# Patient Record
Sex: FEMALE | Race: WHITE | NOT HISPANIC OR LATINO | Employment: UNEMPLOYED | URBAN - METROPOLITAN AREA
[De-identification: names, ages, dates, MRNs, and addresses within clinical notes are randomized per-mention and may not be internally consistent; named-entity substitution may affect disease eponyms.]

---

## 2022-01-06 ENCOUNTER — HOSPITAL ENCOUNTER (EMERGENCY)
Facility: HOSPITAL | Age: 42
Discharge: HOME | End: 2022-01-06
Attending: EMERGENCY MEDICINE
Payer: MEDICAID

## 2022-01-06 VITALS
BODY MASS INDEX: 33.75 KG/M2 | HEART RATE: 82 BPM | OXYGEN SATURATION: 96 % | RESPIRATION RATE: 17 BRPM | TEMPERATURE: 98 F | HEIGHT: 66 IN | SYSTOLIC BLOOD PRESSURE: 142 MMHG | WEIGHT: 210 LBS | DIASTOLIC BLOOD PRESSURE: 92 MMHG

## 2022-01-06 DIAGNOSIS — R10.9 RIGHT FLANK PAIN: ICD-10-CM

## 2022-01-06 DIAGNOSIS — N10 ACUTE PYELONEPHRITIS: Primary | ICD-10-CM

## 2022-01-06 DIAGNOSIS — I10 UNCONTROLLED HYPERTENSION: ICD-10-CM

## 2022-01-06 DIAGNOSIS — K80.20 CALCULUS OF GALLBLADDER WITHOUT CHOLECYSTITIS WITHOUT OBSTRUCTION: ICD-10-CM

## 2022-01-06 LAB
ALBUMIN SERPL BCP-MCNC: 4.1 G/DL (ref 3.5–5.2)
ALP SERPL-CCNC: 73 U/L (ref 55–135)
ALT SERPL W/O P-5'-P-CCNC: 49 U/L (ref 10–44)
ANION GAP SERPL CALC-SCNC: 12 MMOL/L (ref 8–16)
AST SERPL-CCNC: 40 U/L (ref 10–40)
B-HCG UR QL: NEGATIVE
BACTERIA #/AREA URNS HPF: NEGATIVE /HPF
BASOPHILS # BLD AUTO: 0.02 K/UL (ref 0–0.2)
BASOPHILS NFR BLD: 0.2 % (ref 0–1.9)
BILIRUB SERPL-MCNC: 0.4 MG/DL (ref 0.1–1)
BILIRUB UR QL STRIP: NEGATIVE
BUN SERPL-MCNC: 12 MG/DL (ref 6–20)
CALCIUM SERPL-MCNC: 9.2 MG/DL (ref 8.7–10.5)
CHLORIDE SERPL-SCNC: 103 MMOL/L (ref 95–110)
CLARITY UR: ABNORMAL
CO2 SERPL-SCNC: 23 MMOL/L (ref 23–29)
COLOR UR: ABNORMAL
CREAT SERPL-MCNC: 0.7 MG/DL (ref 0.5–1.4)
CTP QC/QA: YES
DIFFERENTIAL METHOD: ABNORMAL
EOSINOPHIL # BLD AUTO: 0.1 K/UL (ref 0–0.5)
EOSINOPHIL NFR BLD: 1 % (ref 0–8)
ERYTHROCYTE [DISTWIDTH] IN BLOOD BY AUTOMATED COUNT: 17.4 % (ref 11.5–14.5)
EST. GFR  (AFRICAN AMERICAN): >60 ML/MIN/1.73 M^2
EST. GFR  (NON AFRICAN AMERICAN): >60 ML/MIN/1.73 M^2
GLUCOSE SERPL-MCNC: 100 MG/DL (ref 70–110)
GLUCOSE UR QL STRIP: NEGATIVE
HCT VFR BLD AUTO: 35.5 % (ref 37–48.5)
HGB BLD-MCNC: 10.5 G/DL (ref 12–16)
HGB UR QL STRIP: ABNORMAL
HYALINE CASTS #/AREA URNS LPF: 11 /LPF
IMM GRANULOCYTES # BLD AUTO: 0.02 K/UL (ref 0–0.04)
IMM GRANULOCYTES NFR BLD AUTO: 0.2 % (ref 0–0.5)
INFLUENZA A, MOLECULAR: NEGATIVE
INFLUENZA B, MOLECULAR: NEGATIVE
KETONES UR QL STRIP: NEGATIVE
LEUKOCYTE ESTERASE UR QL STRIP: NEGATIVE
LYMPHOCYTES # BLD AUTO: 1.6 K/UL (ref 1–4.8)
LYMPHOCYTES NFR BLD: 19.2 % (ref 18–48)
MCH RBC QN AUTO: 21.8 PG (ref 27–31)
MCHC RBC AUTO-ENTMCNC: 29.6 G/DL (ref 32–36)
MCV RBC AUTO: 74 FL (ref 82–98)
MICROSCOPIC COMMENT: ABNORMAL
MONOCYTES # BLD AUTO: 0.3 K/UL (ref 0.3–1)
MONOCYTES NFR BLD: 3.8 % (ref 4–15)
NEUTROPHILS # BLD AUTO: 6.1 K/UL (ref 1.8–7.7)
NEUTROPHILS NFR BLD: 75.6 % (ref 38–73)
NITRITE UR QL STRIP: NEGATIVE
NRBC BLD-RTO: 0 /100 WBC
PH UR STRIP: 6 [PH] (ref 5–8)
PLATELET # BLD AUTO: 330 K/UL (ref 150–450)
PMV BLD AUTO: 9.8 FL (ref 9.2–12.9)
POTASSIUM SERPL-SCNC: 4.6 MMOL/L (ref 3.5–5.1)
PROT SERPL-MCNC: 8.4 G/DL (ref 6–8.4)
PROT UR QL STRIP: ABNORMAL
RBC # BLD AUTO: 4.82 M/UL (ref 4–5.4)
RBC #/AREA URNS HPF: 48 /HPF (ref 0–4)
SARS-COV-2 RDRP RESP QL NAA+PROBE: NEGATIVE
SODIUM SERPL-SCNC: 138 MMOL/L (ref 136–145)
SP GR UR STRIP: 1.02 (ref 1–1.03)
SPECIMEN SOURCE: NORMAL
SQUAMOUS #/AREA URNS HPF: 3 /HPF
URN SPEC COLLECT METH UR: ABNORMAL
UROBILINOGEN UR STRIP-ACNC: NEGATIVE EU/DL
WBC # BLD AUTO: 8.11 K/UL (ref 3.9–12.7)
WBC #/AREA URNS HPF: 7 /HPF (ref 0–5)

## 2022-01-06 PROCEDURE — 63600175 PHARM REV CODE 636 W HCPCS: Performed by: EMERGENCY MEDICINE

## 2022-01-06 PROCEDURE — 99284 EMERGENCY DEPT VISIT MOD MDM: CPT | Mod: 25

## 2022-01-06 PROCEDURE — 96375 TX/PRO/DX INJ NEW DRUG ADDON: CPT

## 2022-01-06 PROCEDURE — 96374 THER/PROPH/DIAG INJ IV PUSH: CPT

## 2022-01-06 PROCEDURE — 81001 URINALYSIS AUTO W/SCOPE: CPT | Performed by: NURSE PRACTITIONER

## 2022-01-06 PROCEDURE — 96361 HYDRATE IV INFUSION ADD-ON: CPT

## 2022-01-06 PROCEDURE — 80053 COMPREHEN METABOLIC PANEL: CPT | Performed by: NURSE PRACTITIONER

## 2022-01-06 PROCEDURE — 25000003 PHARM REV CODE 250: Performed by: EMERGENCY MEDICINE

## 2022-01-06 PROCEDURE — 87502 INFLUENZA DNA AMP PROBE: CPT | Performed by: NURSE PRACTITIONER

## 2022-01-06 PROCEDURE — U0002 COVID-19 LAB TEST NON-CDC: HCPCS | Performed by: NURSE PRACTITIONER

## 2022-01-06 PROCEDURE — 85025 COMPLETE CBC W/AUTO DIFF WBC: CPT | Performed by: NURSE PRACTITIONER

## 2022-01-06 PROCEDURE — 81025 URINE PREGNANCY TEST: CPT | Performed by: NURSE PRACTITIONER

## 2022-01-06 RX ORDER — DEXTROAMPHETAMINE SACCHARATE, AMPHETAMINE ASPARTATE, DEXTROAMPHETAMINE SULFATE AND AMPHETAMINE SULFATE 7.5; 7.5; 7.5; 7.5 MG/1; MG/1; MG/1; MG/1
TABLET ORAL
COMMUNITY
End: 2022-10-28 | Stop reason: ALTCHOICE

## 2022-01-06 RX ORDER — ESCITALOPRAM OXALATE 20 MG/1
TABLET ORAL
COMMUNITY
End: 2022-10-28 | Stop reason: ALTCHOICE

## 2022-01-06 RX ORDER — MORPHINE SULFATE 4 MG/ML
4 INJECTION, SOLUTION INTRAMUSCULAR; INTRAVENOUS
Status: COMPLETED | OUTPATIENT
Start: 2022-01-06 | End: 2022-01-06

## 2022-01-06 RX ORDER — ONDANSETRON 2 MG/ML
4 INJECTION INTRAMUSCULAR; INTRAVENOUS
Status: COMPLETED | OUTPATIENT
Start: 2022-01-06 | End: 2022-01-06

## 2022-01-06 RX ORDER — CEFDINIR 300 MG/1
300 CAPSULE ORAL 2 TIMES DAILY
Qty: 20 CAPSULE | Refills: 0 | Status: SHIPPED | OUTPATIENT
Start: 2022-01-06 | End: 2022-01-16

## 2022-01-06 RX ORDER — DICYCLOMINE HYDROCHLORIDE 20 MG/1
20 TABLET ORAL 2 TIMES DAILY PRN
Qty: 20 TABLET | Refills: 0 | Status: SHIPPED | OUTPATIENT
Start: 2022-01-06 | End: 2022-01-16

## 2022-01-06 RX ADMIN — SODIUM CHLORIDE 1000 ML: 0.9 INJECTION, SOLUTION INTRAVENOUS at 01:01

## 2022-01-06 RX ADMIN — MORPHINE SULFATE 4 MG: 4 INJECTION, SOLUTION INTRAMUSCULAR; INTRAVENOUS at 01:01

## 2022-01-06 RX ADMIN — ONDANSETRON 4 MG: 2 INJECTION INTRAMUSCULAR; INTRAVENOUS at 01:01

## 2022-01-06 NOTE — FIRST PROVIDER EVALUATION
Medical screening exam completed.  I have conducted a focused provider triage encounter, findings are as follows:    Brief history of present illness:  41 year old female presents to the ER with cough and recent exposure to influenza, also new onset right flank pain that began today after standing up from seated positon. No dysuria or other urinary complaints. No hx of renal stones. No SOB or CP    There were no vitals filed for this visit.    Pertinent physical exam:  AAOx3, stable vitals, appears in no acute distress.       Brief workup plan:  See orders placed.     Preliminary workup initiated; this workup will be continued and followed by the physician or advanced practice provider that is assigned to the patient when roomed.

## 2022-01-06 NOTE — ED NOTES
To er with c/o rt flank pain. Onset this am. Maew. Speech clear. Skin w/dr/pk. rr even/unlab. Aaox4. She denies fever, vomiting, constipation, diarrhea.

## 2022-01-06 NOTE — DISCHARGE INSTRUCTIONS
RETURN TO EMERGENCY DEPARTMENT WITHOUT FAIL, IF YOUR SYMPTOMS WORSEN, IF YOU GET NEW OR DIFFERENT SYMPTOMS, IF YOU ARE UNABLE TO FOLLOW UP AS DIRECTED, OR IF YOU HAVE ANY CONCERNS OR WORRIES.    Follow-up with general surgery in your primary care provider on an outpatient basis.  Follow a gallbladder diet.  Take antibiotics as directed.

## 2022-01-06 NOTE — ED PROVIDER NOTES
Encounter Date: 1/6/2022       History     Chief Complaint   Patient presents with    Flank Pain     R flank pain since waking up this AM after urinating      This is a 41-year-old female who presents emergency department with right flank pain she states since this morning and nausea.  She has had pain she actually points to her right lower back.  She says that it does seem to be worse with movement and with palpation.  She says it is associated with nausea but no vomiting.  Has been since this morning.  She does not have any urinary symptoms such as frequency urgency burning or any hematuria however she is on her menstrual cycle.  She does not have any fever or chills reported.  No headache or any muscle aches and body aches.  She did have an exposure to someone with influenza.  She denies any chest pain or any shortness of breath.  She has had a prior appendectomy as far surgical history in the past.  She states she used to have a history of high blood pressure but she has never been prescribed medication.        Review of patient's allergies indicates:  No Known Allergies  No past medical history on file.  No past surgical history on file.  No family history on file.     Review of Systems   Constitutional: Negative for chills and fever.   HENT: Negative for sore throat.    Respiratory: Negative for shortness of breath.    Cardiovascular: Negative for chest pain and palpitations.   Gastrointestinal: Positive for abdominal pain and nausea. Negative for vomiting.   Genitourinary: Negative for dysuria.   Musculoskeletal: Positive for back pain.   Skin: Negative for rash.   Neurological: Negative for seizures, weakness and headaches.   Hematological: Does not bruise/bleed easily.   All other systems reviewed and are negative.      Physical Exam     Initial Vitals [01/06/22 1052]   BP Pulse Resp Temp SpO2   (!) 179/117 88 18 97.8 °F (36.6 °C) 95 %      MAP       --         Physical Exam    Nursing note and vitals  reviewed.  Constitutional: She appears well-developed and well-nourished. She is Obese . No distress.   HENT:   Head: Normocephalic and atraumatic.   Mouth/Throat: No oropharyngeal exudate.   Eyes: Conjunctivae and EOM are normal. Pupils are equal, round, and reactive to light.   Neck: Neck supple. No tracheal deviation present.   Normal range of motion.  Cardiovascular: Normal rate, regular rhythm, normal heart sounds and intact distal pulses.   No murmur heard.  Pulmonary/Chest: Breath sounds normal. No stridor. No respiratory distress. She has no wheezes. She has no rhonchi. She has no rales.   Abdominal: Abdomen is soft. She exhibits no distension. There is abdominal tenderness (Right upper quadrant tenderness to palpation). There is no rebound and no guarding.   Musculoskeletal:         General: No tenderness or edema. Normal range of motion.      Cervical back: Normal range of motion and neck supple.      Comments: Low back tenderness to palpation, no right-sided CVA tenderness to palpation.     Lymphadenopathy:     She has no cervical adenopathy.   Neurological: She is alert and oriented to person, place, and time. She has normal strength. No cranial nerve deficit or sensory deficit. GCS score is 15. GCS eye subscore is 4. GCS verbal subscore is 5. GCS motor subscore is 6.   Skin: Skin is warm and dry. Capillary refill takes less than 2 seconds. No rash noted. No erythema. No pallor.   Psychiatric: She has a normal mood and affect. Her behavior is normal. Thought content normal.         ED Course   Procedures  Labs Reviewed   CBC W/ AUTO DIFFERENTIAL - Abnormal; Notable for the following components:       Result Value    Hemoglobin 10.5 (*)     Hematocrit 35.5 (*)     MCV 74 (*)     MCH 21.8 (*)     MCHC 29.6 (*)     RDW 17.4 (*)     Gran % 75.6 (*)     Mono % 3.8 (*)     All other components within normal limits   COMPREHENSIVE METABOLIC PANEL - Abnormal; Notable for the following components:    ALT 49 (*)      All other components within normal limits   URINALYSIS, REFLEX TO URINE CULTURE - Abnormal; Notable for the following components:    Color, UA Orange (*)     Appearance, UA Hazy (*)     Protein, UA 1+ (*)     Occult Blood UA 3+ (*)     All other components within normal limits    Narrative:     Specimen Source->Urine   URINALYSIS MICROSCOPIC - Abnormal; Notable for the following components:    RBC, UA 48 (*)     WBC, UA 7 (*)     Hyaline Casts, UA 11 (*)     All other components within normal limits    Narrative:     Specimen Source->Urine   INFLUENZA A AND B ANTIGEN    Narrative:     Specimen Source->Nasopharyngeal Swab   SARS-COV-2 RNA AMPLIFICATION, QUAL   LIPASE   URINALYSIS   POCT URINE PREGNANCY           Results for orders placed or performed during the hospital encounter of 01/06/22   CBC auto differential   Result Value Ref Range    WBC 8.11 3.90 - 12.70 K/uL    RBC 4.82 4.00 - 5.40 M/uL    Hemoglobin 10.5 (L) 12.0 - 16.0 g/dL    Hematocrit 35.5 (L) 37.0 - 48.5 %    MCV 74 (L) 82 - 98 fL    MCH 21.8 (L) 27.0 - 31.0 pg    MCHC 29.6 (L) 32.0 - 36.0 g/dL    RDW 17.4 (H) 11.5 - 14.5 %    Platelets 330 150 - 450 K/uL    MPV 9.8 9.2 - 12.9 fL    Immature Granulocytes 0.2 0.0 - 0.5 %    Gran # (ANC) 6.1 1.8 - 7.7 K/uL    Immature Grans (Abs) 0.02 0.00 - 0.04 K/uL    Lymph # 1.6 1.0 - 4.8 K/uL    Mono # 0.3 0.3 - 1.0 K/uL    Eos # 0.1 0.0 - 0.5 K/uL    Baso # 0.02 0.00 - 0.20 K/uL    nRBC 0 0 /100 WBC    Gran % 75.6 (H) 38.0 - 73.0 %    Lymph % 19.2 18.0 - 48.0 %    Mono % 3.8 (L) 4.0 - 15.0 %    Eosinophil % 1.0 0.0 - 8.0 %    Basophil % 0.2 0.0 - 1.9 %    Differential Method Automated    Comprehensive metabolic panel   Result Value Ref Range    Sodium 138 136 - 145 mmol/L    Potassium 4.6 3.5 - 5.1 mmol/L    Chloride 103 95 - 110 mmol/L    CO2 23 23 - 29 mmol/L    Glucose 100 70 - 110 mg/dL    BUN 12 6 - 20 mg/dL    Creatinine 0.7 0.5 - 1.4 mg/dL    Calcium 9.2 8.7 - 10.5 mg/dL    Total Protein 8.4 6.0 - 8.4  g/dL    Albumin 4.1 3.5 - 5.2 g/dL    Total Bilirubin 0.4 0.1 - 1.0 mg/dL    Alkaline Phosphatase 73 55 - 135 U/L    AST 40 10 - 40 U/L    ALT 49 (H) 10 - 44 U/L    Anion Gap 12 8 - 16 mmol/L    eGFR if African American >60.0 >60 mL/min/1.73 m^2    eGFR if non African American >60.0 >60 mL/min/1.73 m^2   Urinalysis, Reflex to Urine Culture Urine, Clean Catch    Specimen: Urine   Result Value Ref Range    Specimen UA Urine, Clean Catch     Color, UA Orange (A) Yellow, Straw, [place]    Appearance, UA Hazy (A) Clear    pH, UA 6.0 5.0 - 8.0    Specific Gravity, UA 1.020 1.005 - 1.030    Protein, UA 1+ (A) Negative    Glucose, UA Negative Negative    Ketones, UA Negative Negative    Bilirubin (UA) Negative Negative    Occult Blood UA 3+ (A) Negative    Nitrite, UA Negative Negative    Urobilinogen, UA Negative Negative EU/dL    Leukocytes, UA Negative Negative   Influenza antigen Nasopharyngeal Swab   Result Value Ref Range    Influenza A, Molecular Negative Negative    Influenza B, Molecular Negative Negative    Flu A & B Source Nasal swab    COVID-19 Rapid Screening   Result Value Ref Range    SARS-CoV-2 RNA, Amplification, Qual Negative Negative   Urinalysis Microscopic   Result Value Ref Range    RBC, UA 48 (H) 0 - 4 /hpf    WBC, UA 7 (H) 0 - 5 /hpf    Bacteria Negative None-Occ /hpf    Squam Epithel, UA 3 /hpf    Hyaline Casts, UA 11 (A) 0-1/lpf /lpf    Microscopic Comment SEE COMMENT    POCT urine pregnancy   Result Value Ref Range    POC Preg Test, Ur Negative Negative     Acceptable Yes      CT Abdomen Pelvis  Without Contrast   Final Result      US Abdomen Limited   Final Result          Imaging Results          CT Abdomen Pelvis  Without Contrast (Final result)  Result time 01/06/22 14:36:56    Final result by Elias Vernon MD (01/06/22 14:36:56)                 Narrative:    CMS MANDATED QUALITY DATA-CT RADIATION [id number]    All CT scans at this facility use dose modulation, iterative  reconstruction, and or weight-based dosing when appropriate to reduce radiation dose to as low as reasonably achievable.    HISTORY: Right flank pain, renal calculus suspected.    FINDINGS: Noncontrast axial images were obtained. Nonenhanced study is tailored for the detection of urolithiasis, and is insensitive for abnormalities of the solid organs, vasculature and hollow viscera.  Comparison to ultrasound of the same day.    CT ABDOMEN: The lung bases are clear. A 10 cm hepatic hypodensity adjacent to the fissure for the ligamentum teres is suggestive of a cyst, with the unenhanced liver otherwise unremarkable. No calcified gallstones. The unenhanced spleen is normal in size and unremarkable, with the unenhanced pancreas and adrenal glands unremarkable.    There is mild right-sided hydronephrosis with peripelvic and proximal periureteral fat stranding reflecting edema, with no renal or ureteral calculi identified. The abdominal aorta and iliac arteries are normal in caliber, with no bowel obstruction, ascites, or intraperitoneal free air. There are scattered colon diverticula without evidence of acute diverticulitis. The appendix is surgically absent.    CT PELVIS: The unenhanced rectum, uterus, adnexa and urinary bladder are unremarkable, with a few calcified pelvic phleboliths. There is no pelvic free fluid or mass, with no pelvic or inguinal lymph node enlargement.    The extraperitoneal soft tissues are unremarkable there are no acute fractures or destructive osseous lesions, with intervertebral disc space narrowing and facet arthropathy in the lumbar spine.    IMPRESSION:  1. Mild right-sided hydronephrosis with nonspecific peripelvic and proximal periureteral edema, perhaps reflecting recently passed calculus, and or urinary tract infection.  2. No definite renal or ureteral calculi.    Electronically signed by:  Elias Vernon MD  1/6/2022 2:36 PM [facility] Workstation: [id number]                             US  Abdomen Limited (Final result)  Result time 01/06/22 13:09:45    Final result by Fredy Knight MD (01/06/22 13:09:45)                 Narrative:    Reason: Right upper quadrant pain    COMPARISON: None    FINDINGS:    Liver is mildly enlarged measuring 18.5 cm and demonstrates mild increased echogenicity. An anechoic round cyst is identified in the left hepatic lobe measuring 1.5 cm and right hepatic lobe measuring 2.3 cm in maximal diameter. No other hepatic lesions observed. Hepatopedal flow in main portal vein.    Echogenic gallstones noted within the gallbladder. No gallbladder wall thickening or pericholecystic fluid. Common duct diameter is normal.    Visualized pancreas is unremarkable. Right kidney is free of nephrolithiasis or hydronephrosis. Aorta is nonaneurysmal.    IMPRESSION:    1.  Hepatic steatosis.  2.  Multiple small liver cysts.  3.  Cholelithiasis.    Electronically signed by:  Fredy Knight DO  1/6/2022 1:09 PM CST Workstation: [id number]                            Results for orders placed or performed during the hospital encounter of 01/06/22   CBC auto differential   Result Value Ref Range    WBC 8.11 3.90 - 12.70 K/uL    RBC 4.82 4.00 - 5.40 M/uL    Hemoglobin 10.5 (L) 12.0 - 16.0 g/dL    Hematocrit 35.5 (L) 37.0 - 48.5 %    MCV 74 (L) 82 - 98 fL    MCH 21.8 (L) 27.0 - 31.0 pg    MCHC 29.6 (L) 32.0 - 36.0 g/dL    RDW 17.4 (H) 11.5 - 14.5 %    Platelets 330 150 - 450 K/uL    MPV 9.8 9.2 - 12.9 fL    Immature Granulocytes 0.2 0.0 - 0.5 %    Gran # (ANC) 6.1 1.8 - 7.7 K/uL    Immature Grans (Abs) 0.02 0.00 - 0.04 K/uL    Lymph # 1.6 1.0 - 4.8 K/uL    Mono # 0.3 0.3 - 1.0 K/uL    Eos # 0.1 0.0 - 0.5 K/uL    Baso # 0.02 0.00 - 0.20 K/uL    nRBC 0 0 /100 WBC    Gran % 75.6 (H) 38.0 - 73.0 %    Lymph % 19.2 18.0 - 48.0 %    Mono % 3.8 (L) 4.0 - 15.0 %    Eosinophil % 1.0 0.0 - 8.0 %    Basophil % 0.2 0.0 - 1.9 %    Differential Method Automated    Comprehensive metabolic panel   Result Value  Ref Range    Sodium 138 136 - 145 mmol/L    Potassium 4.6 3.5 - 5.1 mmol/L    Chloride 103 95 - 110 mmol/L    CO2 23 23 - 29 mmol/L    Glucose 100 70 - 110 mg/dL    BUN 12 6 - 20 mg/dL    Creatinine 0.7 0.5 - 1.4 mg/dL    Calcium 9.2 8.7 - 10.5 mg/dL    Total Protein 8.4 6.0 - 8.4 g/dL    Albumin 4.1 3.5 - 5.2 g/dL    Total Bilirubin 0.4 0.1 - 1.0 mg/dL    Alkaline Phosphatase 73 55 - 135 U/L    AST 40 10 - 40 U/L    ALT 49 (H) 10 - 44 U/L    Anion Gap 12 8 - 16 mmol/L    eGFR if African American >60.0 >60 mL/min/1.73 m^2    eGFR if non African American >60.0 >60 mL/min/1.73 m^2   Urinalysis, Reflex to Urine Culture Urine, Clean Catch    Specimen: Urine   Result Value Ref Range    Specimen UA Urine, Clean Catch     Color, UA Orange (A) Yellow, Straw, [place]    Appearance, UA Hazy (A) Clear    pH, UA 6.0 5.0 - 8.0    Specific Gravity, UA 1.020 1.005 - 1.030    Protein, UA 1+ (A) Negative    Glucose, UA Negative Negative    Ketones, UA Negative Negative    Bilirubin (UA) Negative Negative    Occult Blood UA 3+ (A) Negative    Nitrite, UA Negative Negative    Urobilinogen, UA Negative Negative EU/dL    Leukocytes, UA Negative Negative   Influenza antigen Nasopharyngeal Swab   Result Value Ref Range    Influenza A, Molecular Negative Negative    Influenza B, Molecular Negative Negative    Flu A & B Source Nasal swab    COVID-19 Rapid Screening   Result Value Ref Range    SARS-CoV-2 RNA, Amplification, Qual Negative Negative   Urinalysis Microscopic   Result Value Ref Range    RBC, UA 48 (H) 0 - 4 /hpf    WBC, UA 7 (H) 0 - 5 /hpf    Bacteria Negative None-Occ /hpf    Squam Epithel, UA 3 /hpf    Hyaline Casts, UA 11 (A) 0-1/lpf /lpf    Microscopic Comment SEE COMMENT    POCT urine pregnancy   Result Value Ref Range    POC Preg Test, Ur Negative Negative     Acceptable Yes      CT Abdomen Pelvis  Without Contrast   Final Result      US Abdomen Limited   Final Result             Medications   morphine  injection 4 mg (4 mg Intravenous Given 1/6/22 1350)   ondansetron injection 4 mg (4 mg Intravenous Given 1/6/22 1356)   sodium chloride 0.9% bolus 1,000 mL (0 mLs Intravenous Stopped 1/6/22 9765)     Medical Decision Making:   ED Management:  This is a 41-year-old female who presents emergency department right low back right flank pain as described above.  She actually improved after treatment receiving emergency department.  Pain resolved.  Unclear if patient actually passed kidney stone or may have mild urinary tract infection.  Difficult to say given the information on her ureter on CT scan which could be from recently passed stone the or with the white blood cells in the urine could be from early pyelonephritis.  Her pain improved and resolved upon repeat evaluation.  Out of abundance of precaution are place her on antibiotics for the white blood cells in her urine in the findings on CT scan around the ureter.  No obstructing stone currently.  Also considered that this could be her gallbladder as she had right upper quadrant tenderness to palpation she does have gallstones which she does not have cholecystitis on today's emergency department presentation.  I recommend diet for her gallbladder and follow up with General surgery on an outpatient basis.  She and her mother are in agreement with the plan.     I had a detailed discussion with the patient and/or guardian regarding: The historical points, exam findings, and diagnostic results supporting the discharge diagnosis, lab results, pertinent radiology results, and the need for outpatient follow-up, for definitive care with a family practitioner and to return to the emergency department if symptoms worsen or persist or if there are any questions or concerns that arise at home. All questions have been answered in detail. Strict return to Emergency Department precautions have been provided.    A dictation software program was used for this note.  Please expect  some simple typographical  errors in this note.    This patient was seen during the context of the Covid 19 global pandemic where local, state, hospital guidelines, were followed to the best of ability given the circumstances of the pandemic.                        Clinical Impression:   Final diagnoses:  [R10.9] Right flank pain  [N10] Acute pyelonephritis (Primary)  [K80.20] Calculus of gallbladder without cholecystitis without obstruction  [I10] Uncontrolled hypertension          ED Disposition Condition    Discharge Stable        ED Prescriptions     Medication Sig Dispense Start Date End Date Auth. Provider    cefdinir (OMNICEF) 300 MG capsule Take 1 capsule (300 mg total) by mouth 2 (two) times daily. for 10 days 20 capsule 1/6/2022 1/16/2022 Arcadio Mi DO    dicyclomine (BENTYL) 20 mg tablet Take 1 tablet (20 mg total) by mouth 2 (two) times daily as needed. 20 tablet 1/6/2022 1/16/2022 Arcadio Mi DO        Follow-up Information     Follow up With Specialties Details Why Contact Info Additional Information    [facility] - Emergency Dept Emergency Medicine In 3 days  [address] [address]  [phone] 1st floor    Benji Warner MD General Surgery In 3 days  [address]  [address]  [place] [address]  [phone]              Arcadio Mi DO  01/06/22 5474

## 2022-08-19 ENCOUNTER — OFFICE VISIT (OUTPATIENT)
Dept: URGENT CARE | Facility: CLINIC | Age: 42
End: 2022-08-19
Payer: MEDICAID

## 2022-08-19 VITALS
WEIGHT: 231 LBS | OXYGEN SATURATION: 99 % | TEMPERATURE: 98 F | SYSTOLIC BLOOD PRESSURE: 130 MMHG | HEIGHT: 65 IN | BODY MASS INDEX: 38.49 KG/M2 | RESPIRATION RATE: 18 BRPM | HEART RATE: 86 BPM | DIASTOLIC BLOOD PRESSURE: 90 MMHG

## 2022-08-19 DIAGNOSIS — R05.9 COUGH: Primary | ICD-10-CM

## 2022-08-19 DIAGNOSIS — J31.0 RHINITIS, UNSPECIFIED TYPE: ICD-10-CM

## 2022-08-19 DIAGNOSIS — H65.191 ACUTE MIDDLE EAR EFFUSION, RIGHT: ICD-10-CM

## 2022-08-19 DIAGNOSIS — Z20.822 COVID-19 VIRUS NOT DETECTED: ICD-10-CM

## 2022-08-19 LAB
CTP QC/QA: YES
SARS-COV-2 AG RESP QL IA.RAPID: NEGATIVE

## 2022-08-19 PROCEDURE — 3080F PR MOST RECENT DIASTOLIC BLOOD PRESSURE >= 90 MM HG: ICD-10-PCS | Mod: CPTII,S$GLB,, | Performed by: NURSE PRACTITIONER

## 2022-08-19 PROCEDURE — 3075F SYST BP GE 130 - 139MM HG: CPT | Mod: CPTII,S$GLB,, | Performed by: NURSE PRACTITIONER

## 2022-08-19 PROCEDURE — 99204 OFFICE O/P NEW MOD 45 MIN: CPT | Mod: S$GLB,,, | Performed by: NURSE PRACTITIONER

## 2022-08-19 PROCEDURE — 87811 SARS-COV-2 COVID19 W/OPTIC: CPT | Mod: QW,S$GLB,, | Performed by: NURSE PRACTITIONER

## 2022-08-19 PROCEDURE — 3008F BODY MASS INDEX DOCD: CPT | Mod: CPTII,S$GLB,, | Performed by: NURSE PRACTITIONER

## 2022-08-19 PROCEDURE — 99204 PR OFFICE/OUTPT VISIT, NEW, LEVL IV, 45-59 MIN: ICD-10-PCS | Mod: S$GLB,,, | Performed by: NURSE PRACTITIONER

## 2022-08-19 PROCEDURE — 1160F PR REVIEW ALL MEDS BY PRESCRIBER/CLIN PHARMACIST DOCUMENTED: ICD-10-PCS | Mod: CPTII,S$GLB,, | Performed by: NURSE PRACTITIONER

## 2022-08-19 PROCEDURE — 1159F PR MEDICATION LIST DOCUMENTED IN MEDICAL RECORD: ICD-10-PCS | Mod: CPTII,S$GLB,, | Performed by: NURSE PRACTITIONER

## 2022-08-19 PROCEDURE — 1159F MED LIST DOCD IN RCRD: CPT | Mod: CPTII,S$GLB,, | Performed by: NURSE PRACTITIONER

## 2022-08-19 PROCEDURE — 87811 SARS CORONAVIRUS 2 ANTIGEN POCT, MANUAL READ: ICD-10-PCS | Mod: QW,S$GLB,, | Performed by: NURSE PRACTITIONER

## 2022-08-19 PROCEDURE — 3075F PR MOST RECENT SYSTOLIC BLOOD PRESS GE 130-139MM HG: ICD-10-PCS | Mod: CPTII,S$GLB,, | Performed by: NURSE PRACTITIONER

## 2022-08-19 PROCEDURE — 1160F RVW MEDS BY RX/DR IN RCRD: CPT | Mod: CPTII,S$GLB,, | Performed by: NURSE PRACTITIONER

## 2022-08-19 PROCEDURE — 3008F PR BODY MASS INDEX (BMI) DOCUMENTED: ICD-10-PCS | Mod: CPTII,S$GLB,, | Performed by: NURSE PRACTITIONER

## 2022-08-19 PROCEDURE — 3080F DIAST BP >= 90 MM HG: CPT | Mod: CPTII,S$GLB,, | Performed by: NURSE PRACTITIONER

## 2022-08-19 RX ORDER — CETIRIZINE HYDROCHLORIDE 10 MG/1
10 TABLET ORAL DAILY
Qty: 30 TABLET | Refills: 0 | Status: SHIPPED | OUTPATIENT
Start: 2022-08-19 | End: 2022-10-28 | Stop reason: ALTCHOICE

## 2022-08-19 RX ORDER — DOXYCYCLINE 100 MG/1
100 CAPSULE ORAL 2 TIMES DAILY
Qty: 14 CAPSULE | Refills: 0 | Status: SHIPPED | OUTPATIENT
Start: 2022-08-19 | End: 2022-08-26

## 2022-08-19 RX ORDER — FLUTICASONE PROPIONATE 50 MCG
1 SPRAY, SUSPENSION (ML) NASAL DAILY
Qty: 15.8 ML | Refills: 0 | Status: SHIPPED | OUTPATIENT
Start: 2022-08-19 | End: 2022-10-28 | Stop reason: ALTCHOICE

## 2022-08-19 RX ORDER — SERTRALINE HYDROCHLORIDE 25 MG/1
25 TABLET, FILM COATED ORAL DAILY
COMMUNITY
End: 2022-10-28 | Stop reason: DRUGHIGH

## 2022-08-19 RX ORDER — MONTELUKAST SODIUM 10 MG/1
10 TABLET ORAL NIGHTLY
Qty: 30 TABLET | Refills: 0 | Status: SHIPPED | OUTPATIENT
Start: 2022-08-19 | End: 2022-09-18

## 2022-08-19 NOTE — PROGRESS NOTES
"Subjective:       Patient ID: Raina Hoffman is a 41 y.o. female.    Vitals:  height is 5' 5" (1.651 m) and weight is 104.8 kg (231 lb). Her temperature is 98.2 °F (36.8 °C). Her blood pressure is 130/90 (abnormal) and her pulse is 86. Her respiration is 18 and oxygen saturation is 99%.     Chief Complaint: Otalgia    Cough x1 month, not worsening.  Reports yellow sputum production.  Onset of sinus congestion with bilateral ear fullness and intermittent discomfort to right ear x1 week with mild sore throat.    Otalgia   There is pain in both ears. This is a new problem. The current episode started yesterday (x's 2 days ago). The problem occurs constantly. The problem has been unchanged. There has been no fever. The pain is mild. Associated symptoms include coughing (x 1 month) and a sore throat (mild, x 1 week). Pertinent negatives include no diarrhea, ear discharge, rash or vomiting. Treatments tried: dayquil and nyquil.       Constitution: Positive for chills. Negative for fatigue and fever.   HENT: Positive for ear pain (r>L), congestion (x 1 week) and sore throat (mild, x 1 week). Negative for ear discharge, trouble swallowing and voice change.    Respiratory: Positive for cough (x 1 month) and sputum production (yellow). Negative for chest tightness, shortness of breath and wheezing.    Gastrointestinal: Negative for nausea, vomiting and diarrhea.   Musculoskeletal: Negative for muscle ache.   Skin: Negative for rash.       Objective:      Physical Exam   Constitutional: She is oriented to person, place, and time. She appears well-developed.  Non-toxic appearance. She does not appear ill. No distress.   HENT:   Head: Normocephalic and atraumatic.   Ears:   Right Ear: External ear and ear canal normal. Tympanic membrane is bulging. Tympanic membrane is not erythematous. A middle ear effusion is present.   Left Ear: External ear and ear canal normal. Tympanic membrane is bulging. Tympanic membrane is not " erythematous.   Nose: Rhinorrhea and congestion present.   Mouth/Throat: Oropharynx is clear and moist. Mucous membranes are moist. No oropharyngeal exudate or posterior oropharyngeal erythema.   Eyes: Conjunctivae and EOM are normal. Right eye exhibits no discharge. Left eye exhibits no discharge.   Neck: Neck supple. No neck rigidity present.   Cardiovascular: Normal rate, regular rhythm and normal heart sounds.   Pulmonary/Chest: Effort normal and breath sounds normal. No respiratory distress. She has no wheezes. She has no rhonchi. She has no rales.   Abdominal: Normal appearance.   Musculoskeletal: Normal range of motion.         General: Normal range of motion.      Cervical back: She exhibits no tenderness.   Lymphadenopathy:     She has no cervical adenopathy.   Neurological: no focal deficit. She is alert and oriented to person, place, and time.   Skin: Skin is warm, dry and not diaphoretic. Capillary refill takes 2 to 3 seconds.   Psychiatric: Her behavior is normal. Mood normal.   Nursing note and vitals reviewed.        Assessment:       1. Cough    2. COVID-19 virus not detected    3. Acute middle ear effusion, right    4. Rhinitis, unspecified type          Plan:         Cough  -     SARS Coronavirus 2 Antigen, POCT Manual Read    COVID-19 virus not detected    Acute middle ear effusion, right  -     fluticasone propionate (FLONASE) 50 mcg/actuation nasal spray; 1 spray (50 mcg total) by Each Nostril route once daily.  Dispense: 15.8 mL; Refill: 0  -     cetirizine (ZYRTEC) 10 MG tablet; Take 1 tablet (10 mg total) by mouth once daily.  Dispense: 30 tablet; Refill: 0  -     montelukast (SINGULAIR) 10 mg tablet; Take 1 tablet (10 mg total) by mouth every evening.  Dispense: 30 tablet; Refill: 0  -     doxycycline (MONODOX) 100 MG capsule; Take 1 capsule (100 mg total) by mouth 2 (two) times daily. for 7 days  Dispense: 14 capsule; Refill: 0    Rhinitis, unspecified type  -     fluticasone propionate  (FLONASE) 50 mcg/actuation nasal spray; 1 spray (50 mcg total) by Each Nostril route once daily.  Dispense: 15.8 mL; Refill: 0  -     cetirizine (ZYRTEC) 10 MG tablet; Take 1 tablet (10 mg total) by mouth once daily.  Dispense: 30 tablet; Refill: 0    Symptomatic treatment to include:  Singulair daily for 1 month  Doxycycline twice a day for 7 days.  Take with food to help minimize stomach upset  Rest, increase fluid intake to thin mucus, include electrolyte replacement  Ibuprofen/Tylenol as directed for fever, sore throat, body aches  Zrytec daily  Flonase daily until bottle empty  guaifenesin twice daily for 10 days over the counter to thin mucus  Mucinex D from behind pharmacy counter at night for congestion.  Nasal saline spray several times a day  or nasal wash systems  Warm, salt water gargles, over the counter throat lozenges or sprays for sore throat.      Return to clinic or seek medical re-evaluation for worsening of symptoms, onset of fever/chills, production of thick green/cloudy mucus or secretions, or worsening of ear pain.

## 2022-08-19 NOTE — PATIENT INSTRUCTIONS
Symptomatic treatment to include:  Singulair daily for 1 month  Doxycycline twice a day for 7 days.  Take with food to help minimize stomach upset  Rest, increase fluid intake to thin mucus, include electrolyte replacement  Ibuprofen/Tylenol as directed for fever, sore throat, body aches  Zrytec daily  Flonase daily until bottle empty  guaifenesin twice daily for 10 days over the counter to thin mucus  Mucinex D from behind pharmacy counter at night for congestion.  Nasal saline spray several times a day  or nasal wash systems  Warm, salt water gargles, over the counter throat lozenges or sprays for sore throat.      Return to clinic or seek medical re-evaluation for worsening of symptoms, onset of fever/chills, production of thick green/cloudy mucus or secretions, or worsening of ear pain.

## 2022-10-28 ENCOUNTER — OFFICE VISIT (OUTPATIENT)
Dept: FAMILY MEDICINE | Facility: CLINIC | Age: 42
End: 2022-10-28
Payer: MEDICAID

## 2022-10-28 VITALS
BODY MASS INDEX: 40.35 KG/M2 | TEMPERATURE: 98 F | SYSTOLIC BLOOD PRESSURE: 132 MMHG | HEIGHT: 65 IN | HEART RATE: 78 BPM | WEIGHT: 242.19 LBS | OXYGEN SATURATION: 98 % | DIASTOLIC BLOOD PRESSURE: 80 MMHG

## 2022-10-28 DIAGNOSIS — Z11.4 SCREENING FOR HIV WITHOUT PRESENCE OF RISK FACTORS: ICD-10-CM

## 2022-10-28 DIAGNOSIS — F32.A DEPRESSION, UNSPECIFIED DEPRESSION TYPE: Primary | ICD-10-CM

## 2022-10-28 DIAGNOSIS — D64.9 ANEMIA, UNSPECIFIED TYPE: ICD-10-CM

## 2022-10-28 DIAGNOSIS — Z11.59 ENCOUNTER FOR HEPATITIS C SCREENING TEST FOR LOW RISK PATIENT: ICD-10-CM

## 2022-10-28 DIAGNOSIS — R53.83 FATIGUE, UNSPECIFIED TYPE: ICD-10-CM

## 2022-10-28 DIAGNOSIS — Z12.39 ENCOUNTER FOR SCREENING FOR MALIGNANT NEOPLASM OF BREAST, UNSPECIFIED SCREENING MODALITY: ICD-10-CM

## 2022-10-28 DIAGNOSIS — Z00.00 ROUTINE HEALTH MAINTENANCE: ICD-10-CM

## 2022-10-28 DIAGNOSIS — Z01.419 WELL WOMAN EXAM: ICD-10-CM

## 2022-10-28 DIAGNOSIS — G56.02 CARPAL TUNNEL SYNDROME OF LEFT WRIST: ICD-10-CM

## 2022-10-28 DIAGNOSIS — Z13.31 POSITIVE DEPRESSION SCREENING: ICD-10-CM

## 2022-10-28 PROCEDURE — 99215 OFFICE O/P EST HI 40 MIN: CPT | Performed by: NURSE PRACTITIONER

## 2022-10-28 PROCEDURE — 3075F SYST BP GE 130 - 139MM HG: CPT | Mod: CPTII,,, | Performed by: NURSE PRACTITIONER

## 2022-10-28 PROCEDURE — 99204 OFFICE O/P NEW MOD 45 MIN: CPT | Mod: S$PBB,,, | Performed by: NURSE PRACTITIONER

## 2022-10-28 PROCEDURE — 3075F PR MOST RECENT SYSTOLIC BLOOD PRESS GE 130-139MM HG: ICD-10-PCS | Mod: CPTII,,, | Performed by: NURSE PRACTITIONER

## 2022-10-28 PROCEDURE — 99204 PR OFFICE/OUTPT VISIT, NEW, LEVL IV, 45-59 MIN: ICD-10-PCS | Mod: S$PBB,,, | Performed by: NURSE PRACTITIONER

## 2022-10-28 PROCEDURE — 1159F MED LIST DOCD IN RCRD: CPT | Mod: CPTII,,, | Performed by: NURSE PRACTITIONER

## 2022-10-28 PROCEDURE — 1159F PR MEDICATION LIST DOCUMENTED IN MEDICAL RECORD: ICD-10-PCS | Mod: CPTII,,, | Performed by: NURSE PRACTITIONER

## 2022-10-28 PROCEDURE — 3079F DIAST BP 80-89 MM HG: CPT | Mod: CPTII,,, | Performed by: NURSE PRACTITIONER

## 2022-10-28 PROCEDURE — 3079F PR MOST RECENT DIASTOLIC BLOOD PRESSURE 80-89 MM HG: ICD-10-PCS | Mod: CPTII,,, | Performed by: NURSE PRACTITIONER

## 2022-10-28 RX ORDER — SERTRALINE HYDROCHLORIDE 100 MG/1
100 TABLET, FILM COATED ORAL DAILY
COMMUNITY
End: 2022-10-28 | Stop reason: SDUPTHER

## 2022-10-28 RX ORDER — SERTRALINE HYDROCHLORIDE 100 MG/1
100 TABLET, FILM COATED ORAL DAILY
Qty: 30 TABLET | Refills: 2 | Status: SHIPPED | OUTPATIENT
Start: 2022-10-28 | End: 2023-06-14

## 2022-10-28 NOTE — PROGRESS NOTES
Patient ID: Raina Hoffman is a 42 y.o. female.    Chief Complaint: Establish Care    Mr. Hoffman presents today to establish care with me as her PCP. She states she has been well overall but she has been having some tingling in her left hand that is intermittent. She states she does not recall any injury to area and she is right hand dominant. She denies any nerve damage that could possibly be contributing to numbness. She has a PMH of anxiety, depression and anemia. She is currently on Zoloft and is asking for medication to be refilled. She does not currently see a psychiatrist but it is something she would consider if depression is no longer controlled with medication.     She denies any significant medical history. She denies any significant family history.     Depression  Visit Type: initial  Onset of symptoms: more than 5 years ago  Progression since onset: controlled  Patient presents with the following symptoms: excessive worry and nervousness/anxiety.  Frequency of symptoms: most days   Severity: moderate   Aggravated by: family issues  Sleep per night: 6 hours  Sleep quality: fair  Nighttime awakenings: occasional  Risk factors: no known risk factors  Patient has a history of: anxiety/panic attacks  Treatment tried: SSRI  Compliance with treatment: good  Improvement on treatment: moderate    We reviewed overdue health maintenance.       Past Medical History:   Diagnosis Date    Anxiety     Depression      Past Surgical History:   Procedure Laterality Date    APPENDECTOMY       SECTION      EYE SURGERY      TUBAL LIGATION           Tobacco History:  reports that she has never smoked. She has never been exposed to tobacco smoke. She has never used smokeless tobacco.      Review of patient's allergies indicates:  No Known Allergies    Current Outpatient Medications:     sertraline (ZOLOFT) 100 MG tablet, Take 1 tablet (100 mg total) by mouth once daily., Disp: 30 tablet, Rfl: 2    Review of  "Systems   Constitutional:  Positive for unexpected weight change. Negative for activity change, appetite change, fatigue and fever.   HENT:  Positive for trouble swallowing. Negative for congestion, hearing loss, mouth sores, nosebleeds, postnasal drip, rhinorrhea, sinus pressure, sinus pain, sneezing and sore throat.    Eyes:  Negative for pain, discharge, redness, itching and visual disturbance.   Respiratory:  Negative for chest tightness and wheezing.    Gastrointestinal:  Negative for abdominal pain, blood in stool, constipation, diarrhea and vomiting.   Endocrine: Positive for polyuria. Negative for cold intolerance, heat intolerance, polydipsia and polyphagia.   Genitourinary:  Negative for difficulty urinating, dysuria, flank pain, frequency, genital sores, hematuria, menstrual problem, urgency, vaginal bleeding and vaginal discharge.   Musculoskeletal:  Negative for arthralgias, joint swelling, myalgias and neck pain.   Skin:  Negative for rash and wound.   Allergic/Immunologic: Negative for environmental allergies and food allergies.   Neurological:  Negative for weakness, light-headedness and headaches.   Hematological:  Negative for adenopathy. Does not bruise/bleed easily.   Psychiatric/Behavioral:  Positive for depression and dysphoric mood. Negative for agitation, hallucinations, self-injury and sleep disturbance. The patient is nervous/anxious.         Objective:      Vitals:    10/28/22 0957   BP: 132/80   Pulse: 78   Temp: 98.3 °F (36.8 °C)   TempSrc: Oral   SpO2: 98%   Weight: 109.9 kg (242 lb 3.2 oz)   Height: 5' 5" (1.651 m)     Physical Exam  Vitals reviewed.   Constitutional:       General: She is not in acute distress.     Appearance: Normal appearance. She is obese. She is not ill-appearing, toxic-appearing or diaphoretic.      Comments: BMI 40.30 kg   HENT:      Head: Normocephalic and atraumatic.      Right Ear: Tympanic membrane and external ear normal. There is no impacted cerumen.      " Left Ear: Tympanic membrane and external ear normal. There is no impacted cerumen.      Nose: Nose normal. No congestion or rhinorrhea.      Mouth/Throat:      Mouth: Mucous membranes are moist.      Pharynx: Oropharynx is clear. No oropharyngeal exudate or posterior oropharyngeal erythema.   Eyes:      General: No scleral icterus.        Right eye: No discharge.         Left eye: No discharge.      Extraocular Movements: Extraocular movements intact.      Conjunctiva/sclera: Conjunctivae normal.      Pupils: Pupils are equal, round, and reactive to light.   Neck:      Vascular: No carotid bruit.   Cardiovascular:      Rate and Rhythm: Normal rate and regular rhythm.      Pulses: Normal pulses.      Heart sounds: Normal heart sounds. No murmur heard.    No friction rub. No gallop.   Pulmonary:      Effort: Pulmonary effort is normal. No respiratory distress.      Breath sounds: Normal breath sounds. No stridor. No rales.   Chest:      Chest wall: No tenderness.   Abdominal:      General: Abdomen is flat. Bowel sounds are normal.      Palpations: Abdomen is soft. There is no mass.      Tenderness: There is no abdominal tenderness. There is no guarding.   Musculoskeletal:         General: No swelling or signs of injury. Normal range of motion.      Cervical back: Normal range of motion and neck supple. No rigidity or tenderness.      Right lower leg: No edema.      Left lower leg: No edema.   Skin:     General: Skin is warm and dry.      Capillary Refill: Capillary refill takes less than 2 seconds.      Findings: No bruising, lesion or rash.   Neurological:      General: No focal deficit present.      Mental Status: She is alert and oriented to person, place, and time. Mental status is at baseline.      Motor: No weakness.      Coordination: Coordination normal.   Psychiatric:         Mood and Affect: Mood normal.         Behavior: Behavior normal.         Thought Content: Thought content normal.         Judgment:  Judgment normal.         Assessment:       1. Depression, unspecified depression type    2. Routine health maintenance    3. Anemia, unspecified type    4. Fatigue, unspecified type    5. Screening for HIV without presence of risk factors    6. Encounter for hepatitis C screening test for low risk patient    7. Carpal tunnel syndrome of left wrist    8. Well woman exam    9. Encounter for screening for malignant neoplasm of breast, unspecified screening modality    10. Positive depression screening           Plan:       Depression, unspecified depression type  -     sertraline (ZOLOFT) 100 MG tablet; Take 1 tablet (100 mg total) by mouth once daily.  Dispense: 30 tablet; Refill: 2    Routine health maintenance  -     Comprehensive Metabolic Panel; Future; Expected date: 10/28/2022  -     Lipid Panel; Future; Expected date: 10/28/2022    Anemia, unspecified type  -     CBC auto differential; Future; Expected date: 10/28/2022  -     Iron and TIBC; Future; Expected date: 10/28/2022    Fatigue, unspecified type  -     TSH; Future; Expected date: 10/28/2022  -     Vitamin D; Future; Expected date: 10/28/2022    Screening for HIV without presence of risk factors  -     HIV 1/2 Ag/Ab (4th Gen); Future; Expected date: 10/28/2022    Encounter for hepatitis C screening test for low risk patient  -     Hepatitis C antibody; Future; Expected date: 10/28/2022    Carpal tunnel syndrome of left wrist  -     Ambulatory referral/consult to Neurology; Future; Expected date: 11/04/2022    Well woman exam  -     Ambulatory referral/consult to Obstetrics / Gynecology; Future; Expected date: 11/04/2022    Encounter for screening for malignant neoplasm of breast, unspecified screening modality  -     Mammo Digital Screening Bilat; Future; Expected date: 10/28/2022    Positive depression screening  Comments:  I have reviewed the positive depression score which warrants active treatment with psychotherapy and/or medications.    Follow up in  about 1 year (around 10/28/2023), or if symptoms worsen or fail to improve, for Annual.        10/28/2022 Laura Abdalla NP      I have reviewed the positive depression score which warrants active treatment with psychotherapy and/or medications. Medication sent to pharmacy on file.

## 2022-11-09 ENCOUNTER — TELEPHONE (OUTPATIENT)
Dept: FAMILY MEDICINE | Facility: CLINIC | Age: 42
End: 2022-11-09

## 2022-11-09 DIAGNOSIS — Z12.31 BREAST CANCER SCREENING BY MAMMOGRAM: Primary | ICD-10-CM

## 2022-11-09 NOTE — TELEPHONE ENCOUNTER
----- Message from Vera Fox sent at 11/9/2022 11:07 AM CST -----  Regarding: Screening Mammogram  Good morning! The DX code Z12.39 is not a payable DX code on this order. Please correct with DX code Z12.31, so that we can get the patient scheduled as soon as possible please.    Thank you,  Vera

## 2022-11-16 LAB
25(OH)D3 SERPL-MCNC: 20 NG/ML (ref 30–100)
ALBUMIN SERPL-MCNC: 4.3 G/DL (ref 3.6–5.1)
ALBUMIN/GLOB SERPL: 1.3 (CALC) (ref 1–2.5)
ALP SERPL-CCNC: 69 U/L (ref 31–125)
ALT SERPL-CCNC: 37 U/L (ref 6–29)
AST SERPL-CCNC: 25 U/L (ref 10–30)
BASOPHILS # BLD AUTO: 32 CELLS/UL (ref 0–200)
BASOPHILS NFR BLD AUTO: 0.6 %
BILIRUB SERPL-MCNC: 0.4 MG/DL (ref 0.2–1.2)
BUN SERPL-MCNC: 11 MG/DL (ref 7–25)
BUN/CREAT SERPL: ABNORMAL (CALC) (ref 6–22)
CALCIUM SERPL-MCNC: 9.3 MG/DL (ref 8.6–10.2)
CHLORIDE SERPL-SCNC: 109 MMOL/L (ref 98–110)
CHOLEST SERPL-MCNC: 224 MG/DL
CHOLEST/HDLC SERPL: 5.1 (CALC)
CO2 SERPL-SCNC: 25 MMOL/L (ref 20–32)
CREAT SERPL-MCNC: 0.7 MG/DL (ref 0.5–0.99)
EGFR: 111 ML/MIN/1.73M2
EOSINOPHIL # BLD AUTO: 170 CELLS/UL (ref 15–500)
EOSINOPHIL NFR BLD AUTO: 3.2 %
ERYTHROCYTE [DISTWIDTH] IN BLOOD BY AUTOMATED COUNT: 18.4 % (ref 11–15)
GLOBULIN SER CALC-MCNC: 3.3 G/DL (CALC) (ref 1.9–3.7)
GLUCOSE SERPL-MCNC: 94 MG/DL (ref 65–99)
HCT VFR BLD AUTO: 32.7 % (ref 35–45)
HCV AB S/CO SERPL IA: 0.04
HCV AB SERPL QL IA: NORMAL
HDLC SERPL-MCNC: 44 MG/DL
HGB BLD-MCNC: 10.2 G/DL (ref 11.7–15.5)
HIV 1+2 AB+HIV1 P24 AG SERPL QL IA: NORMAL
IRON SATN MFR SERPL: 7 % (CALC) (ref 16–45)
IRON SERPL-MCNC: 31 MCG/DL (ref 40–190)
LDLC SERPL CALC-MCNC: 158 MG/DL (CALC)
LYMPHOCYTES # BLD AUTO: 1590 CELLS/UL (ref 850–3900)
LYMPHOCYTES NFR BLD AUTO: 30 %
MCH RBC QN AUTO: 22.8 PG (ref 27–33)
MCHC RBC AUTO-ENTMCNC: 31.2 G/DL (ref 32–36)
MCV RBC AUTO: 73 FL (ref 80–100)
MONOCYTES # BLD AUTO: 360 CELLS/UL (ref 200–950)
MONOCYTES NFR BLD AUTO: 6.8 %
NEUTROPHILS # BLD AUTO: 3148 CELLS/UL (ref 1500–7800)
NEUTROPHILS NFR BLD AUTO: 59.4 %
NONHDLC SERPL-MCNC: 180 MG/DL (CALC)
PLATELET # BLD AUTO: 332 THOUSAND/UL (ref 140–400)
PMV BLD REES-ECKER: 10.7 FL (ref 7.5–12.5)
POTASSIUM SERPL-SCNC: 4.5 MMOL/L (ref 3.5–5.3)
PROT SERPL-MCNC: 7.6 G/DL (ref 6.1–8.1)
RBC # BLD AUTO: 4.48 MILLION/UL (ref 3.8–5.1)
SODIUM SERPL-SCNC: 141 MMOL/L (ref 135–146)
TIBC SERPL-MCNC: 417 MCG/DL (CALC) (ref 250–450)
TRIGL SERPL-MCNC: 103 MG/DL
TSH SERPL-ACNC: 1.74 MIU/L
WBC # BLD AUTO: 5.3 THOUSAND/UL (ref 3.8–10.8)

## 2022-12-02 ENCOUNTER — HOSPITAL ENCOUNTER (OUTPATIENT)
Dept: RADIOLOGY | Facility: HOSPITAL | Age: 42
Discharge: HOME | End: 2022-12-02
Attending: NURSE PRACTITIONER
Payer: MEDICAID

## 2022-12-02 DIAGNOSIS — Z12.31 BREAST CANCER SCREENING BY MAMMOGRAM: ICD-10-CM

## 2022-12-02 PROCEDURE — 77063 BREAST TOMOSYNTHESIS BI: CPT | Mod: TC,PO

## 2022-12-02 PROCEDURE — 77067 SCR MAMMO BI INCL CAD: CPT | Mod: TC,PO

## 2023-04-04 ENCOUNTER — HOSPITAL ENCOUNTER (EMERGENCY)
Facility: HOSPITAL | Age: 43
Discharge: HOME | End: 2023-04-04
Attending: EMERGENCY MEDICINE
Payer: MEDICAID

## 2023-04-04 VITALS
HEART RATE: 73 BPM | OXYGEN SATURATION: 97 % | DIASTOLIC BLOOD PRESSURE: 94 MMHG | SYSTOLIC BLOOD PRESSURE: 154 MMHG | WEIGHT: 220 LBS | HEIGHT: 65 IN | RESPIRATION RATE: 18 BRPM | TEMPERATURE: 98 F | BODY MASS INDEX: 36.65 KG/M2

## 2023-04-04 DIAGNOSIS — L72.3 INFECTED SEBACEOUS CYST: Primary | ICD-10-CM

## 2023-04-04 DIAGNOSIS — L08.9 INFECTED SEBACEOUS CYST: Primary | ICD-10-CM

## 2023-04-04 LAB — B-HCG UR QL: NEGATIVE

## 2023-04-04 PROCEDURE — 99284 EMERGENCY DEPT VISIT MOD MDM: CPT | Mod: 25

## 2023-04-04 PROCEDURE — 10060 I&D ABSCESS SIMPLE/SINGLE: CPT

## 2023-04-04 PROCEDURE — 81025 URINE PREGNANCY TEST: CPT | Performed by: PHYSICIAN ASSISTANT

## 2023-04-04 PROCEDURE — 25000003 PHARM REV CODE 250: Performed by: PHYSICIAN ASSISTANT

## 2023-04-04 RX ORDER — LIDOCAINE HYDROCHLORIDE 10 MG/ML
10 INJECTION INFILTRATION; PERINEURAL
Status: COMPLETED | OUTPATIENT
Start: 2023-04-04 | End: 2023-04-04

## 2023-04-04 RX ORDER — MUPIROCIN 20 MG/G
OINTMENT TOPICAL 3 TIMES DAILY
Qty: 30 G | Refills: 0 | Status: SHIPPED | OUTPATIENT
Start: 2023-04-04 | End: 2023-04-14

## 2023-04-04 RX ORDER — IBUPROFEN 600 MG/1
600 TABLET ORAL
Status: COMPLETED | OUTPATIENT
Start: 2023-04-04 | End: 2023-04-04

## 2023-04-04 RX ORDER — SULFAMETHOXAZOLE AND TRIMETHOPRIM 800; 160 MG/1; MG/1
1 TABLET ORAL 2 TIMES DAILY
Qty: 14 TABLET | Refills: 0 | Status: SHIPPED | OUTPATIENT
Start: 2023-04-04 | End: 2023-04-11

## 2023-04-04 RX ORDER — SULFAMETHOXAZOLE AND TRIMETHOPRIM 800; 160 MG/1; MG/1
1 TABLET ORAL
Status: COMPLETED | OUTPATIENT
Start: 2023-04-04 | End: 2023-04-04

## 2023-04-04 RX ADMIN — LIDOCAINE HYDROCHLORIDE 10 ML: 10 INJECTION, SOLUTION INFILTRATION; PERINEURAL at 12:04

## 2023-04-04 RX ADMIN — IBUPROFEN 600 MG: 600 TABLET ORAL at 12:04

## 2023-04-04 RX ADMIN — SULFAMETHOXAZOLE AND TRIMETHOPRIM 1 TABLET: 800; 160 TABLET ORAL at 12:04

## 2023-04-04 NOTE — ED PROVIDER NOTES
Encounter Date: 2023    SCRIBE #1 NOTE: I, Cedric Matute, am scribing for, and in the presence of,  Nallely Fatima PA-C.     History     Chief Complaint   Patient presents with    Abscess     Patient believes she has an abscess or cyst on her chest states that the area has been there about a year and in the past 2 weeks it has become red and swollen      Time seen by provider: 11:52 PM on 2023    Raina Hoffman is a 42 y.o. female who presents to the ED with a cyst. The patient reports having a cyst on her chest for the past few weeks that has since grown bigger and become more painful within the past few days. She has not used any medication for her symptom. The patient denies any other symptoms at this time. No pertinent PMHx. No pertinent PSHx.    The history is provided by the patient.   Review of patient's allergies indicates:  No Known Allergies  Past Medical History:   Diagnosis Date    Anxiety     Depression      Past Surgical History:   Procedure Laterality Date    APPENDECTOMY       SECTION      EYE SURGERY      TUBAL LIGATION       Family History   Problem Relation Age of Onset    No Known Problems Mother     Hypertension Father     Thyroid disease Father      Social History     Tobacco Use    Smoking status: Never     Passive exposure: Never    Smokeless tobacco: Never   Substance Use Topics    Alcohol use: Yes     Comment: socially    Drug use: Never     Review of Systems   Constitutional:  Negative for chills and fever.   Musculoskeletal:  Negative for arthralgias, back pain, myalgias, neck pain and neck stiffness.   Skin:  Positive for wound. Negative for color change, pallor and rash.   Neurological:  Negative for weakness and numbness.   Hematological:  Does not bruise/bleed easily.     Physical Exam     Initial Vitals [23 1103]   BP Pulse Resp Temp SpO2   (!) 152/92 76 20 98.1 °F (36.7 °C) 98 %      MAP       --         Physical Exam    Nursing note and vitals  reviewed.  Constitutional: She appears well-developed and well-nourished. She is not diaphoretic. No distress.   HENT:   Head: Normocephalic and atraumatic.   Cardiovascular:  Normal rate, regular rhythm, normal heart sounds and intact distal pulses.           Pulmonary/Chest: Breath sounds normal. No respiratory distress. She has no wheezes.   Musculoskeletal:         General: Tenderness present. No edema. Normal range of motion.     Neurological: She is alert and oriented to person, place, and time. She has normal strength. No sensory deficit.   Skin: Skin is warm and dry. Abscess noted. No rash noted. There is erythema.   Moderately sized area of erythema and induration with associated TTP noted to mid chest wall.  No active bleeding or discharge.     Psychiatric: She has a normal mood and affect.       ED Course   I & D - Incision and Drainage    Date/Time: 4/4/2023 4:27 PM  Location procedure was performed: [facility] EMERGENCY DEPARTMENT  Performed by: Nallely Fatima PA-C  Authorized by: Yaw Luna MD   Type: abscess  Body area: trunk  Location details: chest  Anesthesia: local infiltration    Anesthesia:  Local Anesthetic: lidocaine 1% without epinephrine  Anesthetic total: 3 mL    Patient sedated: no  Scalpel size: 11  Incision type: single straight  Incision depth: dermal  Drainage: serosanguinous and purulent  Drainage amount: moderate  Wound treatment: incision, drainage, expression of material, wound packed and deloculation  Packing material: 1/4 in gauze  Patient tolerance: Patient tolerated the procedure well with no immediate complications    Incision depth: dermal      Labs Reviewed   PREGNANCY TEST, URINE RAPID    Narrative:     Specimen Source->Urine          Imaging Results    None          Medications   LIDOcaine HCL 10 mg/ml (1%) injection 10 mL (10 mLs Infiltration Given 4/4/23 1252)   ibuprofen tablet 600 mg (600 mg Oral Given 4/4/23 1252)   sulfamethoxazole-trimethoprim 800-160mg  per tablet 1 tablet (1 tablet Oral Given 4/4/23 1252)     Medical Decision Making:   History:   Old Medical Records: I decided to obtain old medical records.  Old Records Summarized: records from clinic visits and records from previous admission(s).  Differential Diagnosis:   Abscess  Cellulitis   Infected sebaceous cyst   Folliculitis   Clinical Tests:   Lab Tests: Ordered and Reviewed  ED Management:  Pt emergently evaluated here in the ED.   Symptoms consistent with infected sebaceous cyst and she tolerated the I&D well.  She will be discharged home to follow-up with either general surgery or dermatology for further management if she wishes to have the cyst capsule removed.  She is given a prescription for Bactrim and topical Bactroban.  She voices understanding and is agreeable to the plan.  She is given specific return precautions.           Scribe Attestation:   Scribe #1: I performed the above scribed service and the documentation accurately describes the services I performed. I attest to the accuracy of the note.    Attending Attestation:     Physician Attestation Statement for NP/PA:   I discussed this assessment and plan of this patient with the NP/PA, but I did not personally examine the patient. The face to face encounter was performed by the NP/PA.    Other NP/PA Attestation Additions:      Medical Decision Making: Raina Hoffman is a 42 y.o. female presenting with longstanding lesion to chest wall with recent enlargement and pain marked by purulent drainage on incision and drainage here.  I suspect infected cyst.  Short course of antibiotics prescribed pending outpatient follow-up.  She is advised to follow-up for definitive management that may include wider excision.                      I, Nallely Fatima PA-C, personally performed the services described in this documentation. All medical record entries made by the scribe were at my direction and in my presence.  I have reviewed the chart and  agree that the record reflects my personal performance and is accurate and complete. Nallely Fatima PA-C.  4:30 PM 04/04/2023    Clinical Impression:   Final diagnoses:  [L72.3, L08.9] Infected sebaceous cyst (Primary)        ED Disposition Condition    Discharge Stable          ED Prescriptions       Medication Sig Dispense Start Date End Date Auth. Provider    sulfamethoxazole-trimethoprim 800-160mg (BACTRIM DS) 800-160 mg Tab Take 1 tablet by mouth 2 (two) times daily. for 7 days 14 tablet 4/4/2023 4/11/2023 Nallely Fatima PA-C    mupirocin (BACTROBAN) 2 % ointment Apply topically 3 (three) times daily. for 10 days 30 g 4/4/2023 4/14/2023 Nallely Fatima PA-C          Follow-up Information       Follow up With Specialties Details Why Contact Info    [facility] Emergency Dept Emergency Medicine  As needed, If symptoms worsen [address] [place] [id number] [phone]    Jayden Mccullough III, MD General Surgery, Surgery  for general surgery evaluation [address] [place] [address]  [address]  [facility] [address]  [phone]      Edwina Powell MD Dermatology  for dermatology evaluation [address] [place] [address] [address]  [facility] [address]  [phone]               Nallely Fatima PA-C  04/04/23 2233

## 2023-04-04 NOTE — ED NOTES
[facility]  INTENSIVE CARE UNIT  PROGRESS NOTE    Patient: Namita Melgar Date of Service: 12/4/2022   MRN: [id number] Time: 6:47 AM    YOB: 1951  Length of Stay: 16 days       SUBJECTIVE     Interval History: Namita Melgar is a 71 year old female with a history of CAD s/p CABG, DM, HLD, HTN that presented 11/18 for robotic RLL wedge lobectomy.     Patient has a CT chest uploaded 11/3 showing from my read a R sided cystic lung disease along with a sub1cm spiculated nodule that on 11/16 enlarged to 2.2x1.5cm.      11/18 RLL wedge, RLL lobectomy, and mediastinal LN dissection was competed. Pathology positive for invasive SCC moderately differentiated with mets to LN including station 7. Chest tube placed R sided.      On 11/22 chest tube was removed. Patient had imaging concerning for infiltrates and began to become hypoxic requiring 2L O2. Started on antibiotics for HAP and steriods + bronchodilators for possible exacerbation of emphysema and gentle diuresis for concern of fluid overload. Patient's hypoxia worsened requiring up to 15L O2 and CTA completed 11/28 showed GGO b/l including MARA and RML and R pleural effusion s/p thoracenteiss 11/28 exudative approx 400cc.  11/29  zosyn was switched to meropenem. She is net neg 17.9L.      12/2 Hypoxia worsened and patient was placed on % then transitioned to bipap. Temp up to 103.  Transferred to ICU.    12/3: Patient tolerated Optiflow 50L/50% for several hours. Started on D5W gtt for hypernatremia    24hr Significant Events: Tolerated BiPAP overnight. This am, patient c/o nausea and \"pain all over.\"  Patient tolertated Optiflow 50Lpm 70% for almost an hour before become hypoxic. Stat CXR revealed increased hazy opacities bilaterally. Patient placed back on BiPAP. Lasix 20mg IV x 1 ordered.     OBJECTIVE     Vital signs for last 24 hours:  Temp:  [97.6 °F (36.4 °C)-99.4 °F (37.4 °C)] 99.4 °F (37.4 °C)  Heart Rate:  [] 92  Resp:   Assumed care    Patient presents to ED with complaints of pain to middle of chest with red, hard, cyst-like swelling without drainage. Denies fever at home. Patient without any changes in bowel or bladder. Patient states she has had hard substance to middle of chest for over a year but in the last 2 days she noticed the pain and swelling with redness. No nausea or vomiting at this time.    [17-40] 17  BP: (126-198)/() 165/66  FiO2 (%):  [35 %-50 %] 50 %    Medications:  Current Facility-Administered Medications   Medication Dose Route Frequency Provider Last Rate Last Admin   • dextrose 5 % infusion   Intravenous Continuous Ginger Terrazas CNP 75 mL/hr at 12/04/22 0622 Rate Verify at 12/04/22 0622   • dextrose 5 % infusion   Intravenous Continuous PRN Nitish Castaneda MD         Current Facility-Administered Medications   Medication   • potassium CHLORIDE 20 mEq/100mL IVPB premix    Followed by   • potassium CHLORIDE 20 mEq/100mL IVPB premix   • insulin glargine (LANTUS) injection 15 Units   • lidocaine (LIDOCARE) 4 % patch 1 patch   • dextrose 5 % infusion   • insulin glargine (LANTUS) injection 10 Units   • fentaNYL (SUBLIMAZE) injection 25 mcg   • labetalol (NORMODYNE) injection 20 mg   • [Held by provider] metoPROLOL tartrate (LOPRESSOR) tablet 50 mg   • pantoprazole (PROTONIX INJECT) injection 40 mg   • metoPROLOL (LOPRESSOR) injection 5 mg   • hydrALAZINE (APRESOLINE) injection 10 mg   • dexAMETHasone (DECADRON) injection 6 mg   • remdesivir (VEKLURY) 100 mg in sodium chloride 0.9 % 250 mL total volume infusion   • bacitracin ointment   • oxymetazoline (AFRIN) 0.05 % nasal spray 2 spray   • [Held by provider] clopidogrel (PLAVIX) tablet 75 mg   • [Held by provider] lactobacillus acidophilus (BACID) tablet 1 tablet   • ipratropium-albuterol (DUONEB) 0.5-2.5 (3) MG/3ML nebulizer solution 3 mL   • [Held by provider] furosemide (LASIX) tablet 40 mg   • [Held by provider] polyethylene glycol (MIRALAX) packet 17 g   • [Held by provider] senna (SENOKOT) 8.6 mg   • insulin lispro (ADMELOG,HumaLOG) - Correction Dose   • insulin lispro (ADMELOG,HumaLOG) - Scheduled Mealtime Dose   • ipratropium-albuterol (DUONEB) 0.5-2.5 (3) MG/3ML nebulizer solution 3 mL   • albuterol (ACCUNEB) nebulizer 0.63 mg   • dextrose 5 % infusion   • amLODIPine (NORVASC) tablet 5 mg   • [Held by provider] atorvastatin  (LIPITOR) tablet 80 mg   • fluticasone-vilanterol (BREO ELLIPTA) 200-25 MCG/ACT inhaler 1 puff   • [Held by provider] gabapentin (NEURONTIN) capsule 300 mg   • [Held by provider] losartan (COZAAR) tablet 100 mg   • sodium chloride 0.9 % flush bag 25 mL   • sodium chloride (PF) 0.9 % injection 2 mL   • sodium chloride (NORMAL SALINE) 0.9 % bolus 500 mL   • sodium chloride 0.9 % flush bag 25 mL   • dextrose 50 % injection 25 g   • dextrose 50 % injection 12.5 g   • glucagon (GLUCAGEN) injection 1 mg   • dextrose (GLUTOSE) 40 % gel 15 g   • dextrose (GLUTOSE) 40 % gel 30 g   • heparin (porcine) injection 5,000 Units   • acetaminophen (TYLENOL) tablet 650 mg    Or   • acetaminophen (TYLENOL) suppository 650 mg   • ondansetron (ZOFRAN ODT) disintegrating tablet 4 mg    Or   • ondansetron (ZOFRAN) injection 4 mg   • polyethylene glycol (MIRALAX) packet 17 g   • docusate sodium-sennosides (SENOKOT S) 50-8.6 MG 2 tablet   • bisacodyl (DULCOLAX) suppository 10 mg   • magnesium hydroxide (MILK OF MAGNESIA) 400 MG/5ML suspension 30 mL   • Potassium Standard Replacement Protocol (Levels 3.5 and lower)   • Magnesium Standard Replacement Protocol   • Phosphorus Standard Replacement Protocol   • [Held by provider] fenofibrate micronized (LOFIBRA) capsule 134 mg   • insulin lispro (ADMELOG,HumaLOG) - Correction Dose       Intake/Output:    Intake/Output Summary (Last 24 hours) at 12/4/2022 0647  Last data filed at 12/4/2022 0622  Gross per 24 hour   Intake 1238.13 ml   Output 1615 ml   Net -376.87 ml       Vent settings for last 24 hours:  FiO2 (%):  [35 %-50 %] 50 %    Imaging:  No results found.    Lab Results:   Lab Results   Component Value Date    SODIUM 144 12/04/2022    POTASSIUM 3.5 12/04/2022    CHLORIDE 111 (H) 12/04/2022    CO2 23 12/04/2022    GLUCOSE 297 (H) 12/04/2022    BUN 62 (H) 12/04/2022    CREATININE 1.14 (H) 12/04/2022    CALCIUM 8.9 12/04/2022    ALBUMIN 1.3 (L) 12/04/2022    MG 1.8 12/04/2022    BILIRUBIN 0.3  12/04/2022    ALKPT 133 (H) 12/04/2022    LACTA 0.7 11/23/2022    AST 37 12/04/2022    GPT 32 12/04/2022    PHOS 3.6 12/04/2022    LIPA 246 12/02/2022    INR 1.0 12/03/2022       Lab Results   Component Value Date    PTT 24 11/16/2022    WBC 24.5 (H) 12/04/2022    HGB 11.0 (L) 12/04/2022    HCT 33.6 (L) 12/04/2022     (H) 12/04/2022    URIC 5.8 11/25/2022    CRP 23.1 (H) 12/03/2022    MARY 42 11/29/2022       Urinalysis  Lab Results   Component Value Date    UTPELC 270 (H) 12/03/2022    USPG 1.015 11/23/2022    UWBC Negative 11/23/2022    URBC Negative 11/23/2022    UBILI Negative 11/23/2022    UPH 6.5 11/23/2022       Microbiology:  No results found for: SDES No results found for: CULT    Physical Exam:   Vital signs reviewed  Gen: In no acute distress  HEENT: Normocephalic  CV: Audible S1, S2. Heart rate regular, not tachycardic  Lung: Respirations regular, tachypnic, nonlabored on BiPAP. Fine crackles auscultated bilaterally. No wheezes or rhonchi  Abd: Soft, nontender nondistended negative hepatosplenomegaly  Ext: Warm, well perfused  Neuro: Awake, alert, oriented to person and place. RUBIO to command  Psych: No acute issues      ASSESSMENT/PLAN   71 year old female admitted to ICU for worsening hypoxic respiratory failure with possible etiologies including worsening bacterial pneumonia & COVID 19.     1. Neurologic  Neuropathy  - no active issues  - hold home gabapentin currently   - tylenol and fentanyl PRN for pain  - mobility:  bedrest      2. Pulmonary   Acute hypoxic respiratory failure  R lung mass  - s/p RLL wedge, RLL lobectomy, and mediastinal LN dissection was competed. Pathology positive for invasive SCC moderately differentiated with mets to LN 11/18  - CT PE study (11/28): negative for PE, showing ground glass opacities, moderate right effusion  - s/p thoracentesis on 11/28  - CXR today with increased haziness bilaterally  - lasix 20mg IV x1  - continue Bipap support with intermittent breaks  for patient comfort as oxygenation tolerates  - Wean oxygen to achieve oxygen saturation goal >92%  - continue antibiotics as below  - continue COVID treatment as below  - continue nebulizer therapy  - pulm following    3.  Cardiovascular   HTN  HLD  CAD  - TTE (11/26): EF 58%, no diastolic dysfunction noted  - hold oral antihypertensives   - lopressor 5mg IV Q6H  - hydralazine PRN  - MAP goal > 65 mmHg, SBP < 160  - Monitor for dysrhythmias     4.  GI  No active issues   - NPO for now    5.  Renal  ADDISON on CKD  Hypernatremia  - BUN/Cr: 62/1.14; stable  - Na 144  - lasix 20mg IV x 1 today  - Strict Intake and Output  - Replete electrolytes per protocol   - Trend BMP    6.  Infectious Disease  R Lung infiltrate concerning for HAP  COVID 19 pneumonia  Leukocytosis  - ID following  - WBC up to 24.5  - all cultures to date negative  - continue meropenem   - cont decadron 6mg daily and   - cont remdesivir  - afebrile overnight  - Monitor temperature curve    7.  Hematology/Oncology  Metastatic lung SCC  - LE dopplers (11/28): negative for DVT  - oncology following  - Hgb/Hct 11.7/36.3; stable  - no active foci of bleeding  - Trend CBC   - Transfuse for hemoglobin less than 7 g/dL     8.  Endocrine  DM  Hyperglycemia  - glucose 323; will give lispro 15 units x 1 today  - cont accu check AC/HS with high dose SSI  - target < 180  - lantus 15units Qam and lantus 10u Qpm        Feeding/fluids:  NPO   Analgesia:  N/A  Sedation:  N/A  Thromboembolism ppx:  Subcutaneous Heparin   Head of bed:  30 Degrees   Ulcer ppx:  Pantoprazole  Glycemic control:  insulin subcutaneous, Lantus  Mobility:  N/A  Spontaneous Breathing Trial:  N/A  Bowel Regimen Initiated: Yes  Indwelling Catheter:  Maintain        Code Status: Full Resuscitation   Disposition: ICU  Family: Patient informed me and RN this am that she wants Constance Cronin to make medical decisions for her if she is unable. At 1700, Constance updated on patient's condition. She  confirms that patient would want to be intubated if her condition worsens. She informed me that she made decisions for the patient when she underwent open heart surgery. She believes she does have paperwork at home, but is currently out of town.     Ginger Terrazas, CNP  Critical Care Medicine    I spent 35 minutes personally attending to this patient's critical care needs for the above listed issues with complex decision making. This time excludes that time spent on separately billable procedures or time spent teaching.      This case was discussed with Dr. Warren who was my collaborating physician during the time of this encounter.     Date of Service: 12/4/2022

## 2023-06-14 DIAGNOSIS — F32.A DEPRESSION, UNSPECIFIED DEPRESSION TYPE: ICD-10-CM

## 2023-06-14 RX ORDER — SERTRALINE HYDROCHLORIDE 100 MG/1
TABLET, FILM COATED ORAL
Qty: 30 TABLET | Refills: 2 | Status: SHIPPED | OUTPATIENT
Start: 2023-06-14 | End: 2023-09-25

## 2023-06-25 ENCOUNTER — OFFICE VISIT (OUTPATIENT)
Dept: URGENT CARE | Facility: CLINIC | Age: 43
End: 2023-06-25
Payer: MEDICAID

## 2023-06-25 VITALS
RESPIRATION RATE: 18 BRPM | OXYGEN SATURATION: 97 % | WEIGHT: 254.19 LBS | DIASTOLIC BLOOD PRESSURE: 97 MMHG | BODY MASS INDEX: 42.35 KG/M2 | HEART RATE: 84 BPM | TEMPERATURE: 98 F | HEIGHT: 65 IN | SYSTOLIC BLOOD PRESSURE: 152 MMHG

## 2023-06-25 DIAGNOSIS — L02.91 ABSCESS: Primary | ICD-10-CM

## 2023-06-25 PROCEDURE — 99213 PR OFFICE/OUTPT VISIT, EST, LEVL III, 20-29 MIN: ICD-10-PCS | Mod: S$GLB,,, | Performed by: NURSE PRACTITIONER

## 2023-06-25 PROCEDURE — 99213 OFFICE O/P EST LOW 20 MIN: CPT | Mod: S$GLB,,, | Performed by: NURSE PRACTITIONER

## 2023-06-25 RX ORDER — MUPIROCIN 20 MG/G
OINTMENT TOPICAL 2 TIMES DAILY
Qty: 30 G | Refills: 1 | Status: SHIPPED | OUTPATIENT
Start: 2023-06-25 | End: 2023-07-02

## 2023-06-25 RX ORDER — SULFAMETHOXAZOLE AND TRIMETHOPRIM 800; 160 MG/1; MG/1
1 TABLET ORAL 2 TIMES DAILY
Qty: 14 TABLET | Refills: 0 | Status: SHIPPED | OUTPATIENT
Start: 2023-06-25 | End: 2023-07-02

## 2023-06-25 NOTE — PROGRESS NOTES
"Subjective:      Patient ID: Raina Hoffman is a 42 y.o. female.    Vitals:  height is 5' 5" (1.651 m) and weight is 115.3 kg (254 lb 3.2 oz). Her oral temperature is 97.7 °F (36.5 °C). Her blood pressure is 152/97 (abnormal) and her pulse is 84. Her respiration is 18 and oxygen saturation is 97%.     Chief Complaint: Cyst    Pt states that her symptoms started 3 weeks ago. Patient has a mass on her neck, pt states that it had drained 1 week ago, red and pain with palpation level 4.    States that it is significantly improved and has drained well however did stop draining approximately 3 days ago.  Abscess to chest I&D in the ER for and half months ago.  States that she is had in the same area in the past, was seen in the emergency department and they also had informed her at that time that they were not comfortable doing I&D due to the location of abscess on the front of her neck due to concern regarding underlying structures      Constitution: Negative for chills and fever.   Gastrointestinal:  Negative for vomiting and diarrhea.   Skin:  Positive for skin thickening/induration and abscess. Negative for erythema.    Objective:     Physical Exam   Constitutional: She is oriented to person, place, and time.  Non-toxic appearance. She does not appear ill. No distress. obesity  HENT:   Head: Normocephalic and atraumatic.   Nose: Nose normal.   Mouth/Throat: Mucous membranes are moist.   Eyes: Conjunctivae are normal.   Cardiovascular: Normal rate.   Pulmonary/Chest: Effort normal. No respiratory distress.   Abdominal: Normal appearance.   Neurological: no focal deficit. She is alert and oriented to person, place, and time.   Skin: Skin is warm, dry and no rash. lesion No bruising and No erythema         Comments: Abscess right anterior neck.  See attached photos   Psychiatric: Her behavior is normal. Mood normal.   Nursing note and vitals reviewed.            Assessment:     1. Abscess        Plan:       Abscess  - "     sulfamethoxazole-trimethoprim 800-160mg (BACTRIM DS) 800-160 mg Tab; Take 1 tablet by mouth 2 (two) times daily. for 7 days  Dispense: 14 tablet; Refill: 0  -     mupirocin (BACTROBAN) 2 % ointment; Apply topically 2 (two) times daily. for 7 days  Dispense: 30 g; Refill: 1        Discussed with patient concern with I&D due to abscess location.  Will treat with Bactrim with patient instructed to continue warm compresses and antibacterial soap

## 2023-06-25 NOTE — PATIENT INSTRUCTIONS
Bactrim twice a day for 7 days.    Warm compresses to the affected area for 15-20 minutes every 2-3 hours until symptoms are significantly improved   Mupirocin ointment as prescribed    Clean the affected area with gold Dial soap or 4% chlorhexidine soap at least twice a day    Return to clinic or seek medical re-evaluation if symptoms worsen or fail to improve after 48-72 hours the above treatment plan

## 2023-09-23 DIAGNOSIS — F32.A DEPRESSION, UNSPECIFIED DEPRESSION TYPE: ICD-10-CM

## 2023-09-25 RX ORDER — SERTRALINE HYDROCHLORIDE 100 MG/1
TABLET, FILM COATED ORAL
Qty: 30 TABLET | Refills: 2 | Status: SHIPPED | OUTPATIENT
Start: 2023-09-25

## 2023-11-07 ENCOUNTER — OFFICE VISIT (OUTPATIENT)
Dept: URGENT CARE | Facility: CLINIC | Age: 43
End: 2023-11-07
Payer: MEDICAID

## 2023-11-07 VITALS
WEIGHT: 224 LBS | BODY MASS INDEX: 37.32 KG/M2 | HEIGHT: 65 IN | DIASTOLIC BLOOD PRESSURE: 86 MMHG | SYSTOLIC BLOOD PRESSURE: 124 MMHG | RESPIRATION RATE: 16 BRPM | OXYGEN SATURATION: 96 % | HEART RATE: 92 BPM | TEMPERATURE: 98 F

## 2023-11-07 DIAGNOSIS — R09.81 NASAL CONGESTION: ICD-10-CM

## 2023-11-07 DIAGNOSIS — J02.9 SORE THROAT: Primary | ICD-10-CM

## 2023-11-07 DIAGNOSIS — H65.03 BILATERAL ACUTE SEROUS OTITIS MEDIA, RECURRENCE NOT SPECIFIED: ICD-10-CM

## 2023-11-07 LAB
CTP QC/QA: YES
S PYO RRNA THROAT QL PROBE: NEGATIVE

## 2023-11-07 PROCEDURE — 87880 STREP A ASSAY W/OPTIC: CPT | Mod: QW,,, | Performed by: PHYSICIAN ASSISTANT

## 2023-11-07 PROCEDURE — 99214 PR OFFICE/OUTPT VISIT, EST, LEVL IV, 30-39 MIN: ICD-10-PCS | Mod: S$GLB,,, | Performed by: PHYSICIAN ASSISTANT

## 2023-11-07 PROCEDURE — 99214 OFFICE O/P EST MOD 30 MIN: CPT | Mod: S$GLB,,, | Performed by: PHYSICIAN ASSISTANT

## 2023-11-07 PROCEDURE — 87880 POCT RAPID STREP A: ICD-10-PCS | Mod: QW,,, | Performed by: PHYSICIAN ASSISTANT

## 2023-11-07 RX ORDER — IBUPROFEN 600 MG/1
600 TABLET ORAL EVERY 8 HOURS PRN
Qty: 20 TABLET | Refills: 0 | OUTPATIENT
Start: 2023-11-07 | End: 2024-03-28

## 2023-11-07 RX ORDER — FLUTICASONE PROPIONATE 50 MCG
1 SPRAY, SUSPENSION (ML) NASAL 2 TIMES DAILY PRN
Qty: 15 G | Refills: 0 | Status: SHIPPED | OUTPATIENT
Start: 2023-11-07

## 2023-11-07 RX ORDER — DEXAMETHASONE SODIUM PHOSPHATE 4 MG/ML
8 INJECTION, SOLUTION INTRA-ARTICULAR; INTRALESIONAL; INTRAMUSCULAR; INTRAVENOUS; SOFT TISSUE
Status: COMPLETED | OUTPATIENT
Start: 2023-11-07 | End: 2023-11-07

## 2023-11-07 RX ORDER — CETIRIZINE HYDROCHLORIDE 10 MG/1
10 TABLET ORAL DAILY
Qty: 30 TABLET | Refills: 0 | Status: SHIPPED | OUTPATIENT
Start: 2023-11-07 | End: 2024-11-06

## 2023-11-07 RX ADMIN — DEXAMETHASONE SODIUM PHOSPHATE 8 MG: 4 INJECTION, SOLUTION INTRA-ARTICULAR; INTRALESIONAL; INTRAMUSCULAR; INTRAVENOUS; SOFT TISSUE at 06:11

## 2023-11-07 NOTE — PROGRESS NOTES
"Subjective:      Patient ID: Raina Hoffman is a 43 y.o. female.    Vitals:  height is 5' 5" (1.651 m) and weight is 101.6 kg (224 lb). Her temperature is 98.2 °F (36.8 °C). Her blood pressure is 124/86 and her pulse is 92. Her respiration is 16 and oxygen saturation is 96%.     Chief Complaint: Sore Throat        Raina Hoffman is a 43 y.o. female presenting for evaluation of sore throat, sinus congestion and right ear pain.  No fever, no chills.  Both of her kids have similar symptoms.  No cough.  No difficulty breathing or shortness of breath.  She took a dose of leftover amoxicillin.  She has a past medical history of Anxiety and Depression.      Sore Throat   This is a new problem. Episode onset: x3 days. Associated symptoms include congestion and ear pain. Pertinent negatives include no abdominal pain, coughing, diarrhea, headaches, neck pain or shortness of breath.       Constitution: Negative for chills and fever.   HENT:  Positive for ear pain, congestion and sore throat.    Neck: Negative for neck pain.   Cardiovascular:  Negative for chest pain and palpitations.   Respiratory:  Negative for cough, shortness of breath and asthma.    Gastrointestinal:  Negative for abdominal pain, nausea, constipation and diarrhea.   Genitourinary:  Negative for dysuria and hematuria.   Musculoskeletal:  Negative for pain.   Skin:  Negative for rash.   Allergic/Immunologic: Negative for asthma.   Neurological:  Negative for dizziness, light-headedness, headaches, numbness and tingling.   Psychiatric/Behavioral:  Negative for nervous/anxious. The patient is not nervous/anxious.       Objective:     Physical Exam   Constitutional: She is oriented to person, place, and time. She does not appear ill. No distress.   HENT:   Head: Normocephalic and atraumatic.   Ears:      Comments: Straw colored fluid noted to bilateral TMs, without erythema or purulence.  Nose: Rhinorrhea and congestion present.   Mouth/Throat: Mucous " membranes are moist. Posterior oropharyngeal erythema present. No oropharyngeal exudate.      Comments: Nasal congestion and rhinorrhea noted.  Mild erythema noted to posterior oropharynx without edema or exudate.    Eyes: Conjunctivae are normal.   Cardiovascular: Normal rate, regular rhythm and normal pulses.   Pulmonary/Chest: Effort normal and breath sounds normal. No respiratory distress. She has no wheezes. She has no rales.         Comments: Equal, bilateral breath sounds noted without wheezing.     Abdominal: Normal appearance.   Musculoskeletal: Normal range of motion.         General: Normal range of motion.   Neurological: no focal deficit. She is alert and oriented to person, place, and time. No sensory deficit.   Skin: Skin is warm, dry and no rash.   Psychiatric: Mood normal.   Nursing note and vitals reviewed.      Assessment:     1. Sore throat    2. Nasal congestion    3. Bilateral acute serous otitis media, recurrence not specified        Plan:       Sore throat  -     POCT rapid strep A  -     dexAMETHasone injection 8 mg  -     cetirizine (ZYRTEC) 10 MG tablet; Take 1 tablet (10 mg total) by mouth once daily.  Dispense: 30 tablet; Refill: 0  -     ibuprofen (ADVIL,MOTRIN) 600 MG tablet; Take 1 tablet (600 mg total) by mouth every 8 (eight) hours as needed for Pain.  Dispense: 20 tablet; Refill: 0  -     fluticasone propionate (FLONASE) 50 mcg/actuation nasal spray; 1 spray (50 mcg total) by Each Nostril route 2 (two) times daily as needed for Rhinitis.  Dispense: 15 g; Refill: 0    Nasal congestion  -     dexAMETHasone injection 8 mg  -     cetirizine (ZYRTEC) 10 MG tablet; Take 1 tablet (10 mg total) by mouth once daily.  Dispense: 30 tablet; Refill: 0  -     ibuprofen (ADVIL,MOTRIN) 600 MG tablet; Take 1 tablet (600 mg total) by mouth every 8 (eight) hours as needed for Pain.  Dispense: 20 tablet; Refill: 0  -     fluticasone propionate (FLONASE) 50 mcg/actuation nasal spray; 1 spray (50 mcg  total) by Each Nostril route 2 (two) times daily as needed for Rhinitis.  Dispense: 15 g; Refill: 0    Bilateral acute serous otitis media, recurrence not specified  -     dexAMETHasone injection 8 mg  -     cetirizine (ZYRTEC) 10 MG tablet; Take 1 tablet (10 mg total) by mouth once daily.  Dispense: 30 tablet; Refill: 0  -     ibuprofen (ADVIL,MOTRIN) 600 MG tablet; Take 1 tablet (600 mg total) by mouth every 8 (eight) hours as needed for Pain.  Dispense: 20 tablet; Refill: 0  -     fluticasone propionate (FLONASE) 50 mcg/actuation nasal spray; 1 spray (50 mcg total) by Each Nostril route 2 (two) times daily as needed for Rhinitis.  Dispense: 15 g; Refill: 0          Medical Decision Making:   History:   Old Medical Records: I decided to obtain old medical records.  Old Records Summarized: records from clinic visits and records from previous admission(s).  Differential Diagnosis:   Influenza  Pneumonia  Strep pharyngitis  Meningitis  Viral syndrome      Clinical Tests:   Lab Tests: Ordered and Reviewed  Urgent Care Management:  Rapid strep is negative.  Symptoms consistent with viral syndrome and associated serous otitis media.  She will be discharged home to follow up with her primary care provider for re-evaluation and further management.  She voices understanding and is agreeable with the plan.  She is given specific return precautions.

## 2023-11-13 ENCOUNTER — PATIENT MESSAGE (OUTPATIENT)
Dept: FAMILY MEDICINE | Facility: CLINIC | Age: 43
End: 2023-11-13

## 2023-11-13 DIAGNOSIS — T75.3XXA MOTION SICKNESS, INITIAL ENCOUNTER: Primary | ICD-10-CM

## 2023-11-13 RX ORDER — SCOLOPAMINE TRANSDERMAL SYSTEM 1 MG/1
1 PATCH, EXTENDED RELEASE TRANSDERMAL
Qty: 4 PATCH | Refills: 0 | Status: SHIPPED | OUTPATIENT
Start: 2023-11-13 | End: 2023-11-25

## 2024-01-10 ENCOUNTER — OFFICE VISIT (OUTPATIENT)
Dept: FAMILY MEDICINE | Facility: CLINIC | Age: 44
End: 2024-01-10
Payer: MEDICAID

## 2024-01-10 VITALS
RESPIRATION RATE: 20 BRPM | DIASTOLIC BLOOD PRESSURE: 80 MMHG | BODY MASS INDEX: 37.52 KG/M2 | WEIGHT: 225.19 LBS | TEMPERATURE: 98 F | HEART RATE: 84 BPM | HEIGHT: 65 IN | SYSTOLIC BLOOD PRESSURE: 132 MMHG

## 2024-01-10 DIAGNOSIS — Z01.419 WELL WOMAN EXAM: ICD-10-CM

## 2024-01-10 DIAGNOSIS — D64.9 ANEMIA, UNSPECIFIED TYPE: ICD-10-CM

## 2024-01-10 DIAGNOSIS — F32.A DEPRESSION, UNSPECIFIED DEPRESSION TYPE: ICD-10-CM

## 2024-01-10 DIAGNOSIS — Z13.31 POSITIVE DEPRESSION SCREENING: ICD-10-CM

## 2024-01-10 DIAGNOSIS — Z12.39 ENCOUNTER FOR SCREENING FOR MALIGNANT NEOPLASM OF BREAST, UNSPECIFIED SCREENING MODALITY: ICD-10-CM

## 2024-01-10 DIAGNOSIS — Z00.00 ROUTINE HEALTH MAINTENANCE: ICD-10-CM

## 2024-01-10 DIAGNOSIS — E78.5 HYPERLIPIDEMIA, UNSPECIFIED HYPERLIPIDEMIA TYPE: Primary | ICD-10-CM

## 2024-01-10 DIAGNOSIS — Z13.1 DIABETES MELLITUS SCREENING: ICD-10-CM

## 2024-01-10 PROCEDURE — 99999 PR PBB SHADOW E&M-EST. PATIENT-LVL V: CPT | Mod: PBBFAC,,, | Performed by: NURSE PRACTITIONER

## 2024-01-10 PROCEDURE — 3079F DIAST BP 80-89 MM HG: CPT | Mod: CPTII,,, | Performed by: NURSE PRACTITIONER

## 2024-01-10 PROCEDURE — 1159F MED LIST DOCD IN RCRD: CPT | Mod: CPTII,,, | Performed by: NURSE PRACTITIONER

## 2024-01-10 PROCEDURE — 3075F SYST BP GE 130 - 139MM HG: CPT | Mod: CPTII,,, | Performed by: NURSE PRACTITIONER

## 2024-01-10 PROCEDURE — 99215 OFFICE O/P EST HI 40 MIN: CPT | Mod: PBBFAC,PN | Performed by: NURSE PRACTITIONER

## 2024-01-10 PROCEDURE — 3008F BODY MASS INDEX DOCD: CPT | Mod: CPTII,,, | Performed by: NURSE PRACTITIONER

## 2024-01-10 PROCEDURE — 99396 PREV VISIT EST AGE 40-64: CPT | Mod: S$PBB,,, | Performed by: NURSE PRACTITIONER

## 2024-01-10 RX ORDER — CARIPRAZINE 1.5 MG/1
1.5 CAPSULE, GELATIN COATED ORAL DAILY
Qty: 30 CAPSULE | Refills: 5 | Status: SHIPPED | OUTPATIENT
Start: 2024-01-10

## 2024-01-10 NOTE — PROGRESS NOTES
I have used clinical judgement based on duration and functional status to consider definite necessity for treatment. Orders placed Patient ID: Raina Hoffman is a 43 y.o. female.    Chief Complaint: Annual Exam (44 yo female here for annual check up. Pt states no c/o. KM)    MS. Hoffman presents today for follow up. She states she is doing well at the time of this visit. She is due to have follow up blood work at this time. She has been taking medication for depression as of late but she states it has not been helpful with motivation and she does not want to do anything most days. She is requesting referral to be seen by psychiatry. She denies any other issues or complaints.       Past Medical History:   Diagnosis Date    Anxiety     Depression      Past Surgical History:   Procedure Laterality Date    APPENDECTOMY       SECTION      EYE SURGERY      TUBAL LIGATION           Tobacco History:  reports that she has never smoked. She has never been exposed to tobacco smoke. She has never used smokeless tobacco.      Review of patient's allergies indicates:  No Known Allergies    Current Outpatient Medications:     sertraline (ZOLOFT) 100 MG tablet, TAKE 1 TABLET(100 MG) BY MOUTH EVERY DAY, Disp: 30 tablet, Rfl: 2    cariprazine (VRAYLAR) 1.5 mg Cap, Take 1 capsule (1.5 mg total) by mouth once daily., Disp: 30 capsule, Rfl: 5    cetirizine (ZYRTEC) 10 MG tablet, Take 1 tablet (10 mg total) by mouth once daily. (Patient not taking: Reported on 1/10/2024), Disp: 30 tablet, Rfl: 0    fluticasone propionate (FLONASE) 50 mcg/actuation nasal spray, 1 spray (50 mcg total) by Each Nostril route 2 (two) times daily as needed for Rhinitis. (Patient not taking: Reported on 1/10/2024), Disp: 15 g, Rfl: 0    ibuprofen (ADVIL,MOTRIN) 600 MG tablet, Take 1 tablet (600 mg total) by mouth every 8 (eight) hours as needed for Pain. (Patient not taking: Reported on 1/10/2024), Disp: 20 tablet, Rfl: 0    Review of Systems  "  Constitutional:  Negative for activity change and unexpected weight change.   HENT:  Negative for hearing loss, rhinorrhea and trouble swallowing.    Eyes:  Negative for discharge and visual disturbance.   Respiratory:  Negative for chest tightness and wheezing.    Cardiovascular:  Negative for chest pain and palpitations.   Gastrointestinal:  Negative for blood in stool, constipation, diarrhea and vomiting.   Endocrine: Positive for polyuria. Negative for polydipsia.   Genitourinary:  Negative for difficulty urinating, dysuria, hematuria and menstrual problem.   Musculoskeletal:  Negative for arthralgias, joint swelling and neck pain.   Neurological:  Negative for weakness and headaches.   Psychiatric/Behavioral:  Positive for dysphoric mood. Negative for confusion.           Objective:      Vitals:    01/10/24 1331   BP: 132/80   Pulse: 84   Resp: 20   Temp: 98.1 °F (36.7 °C)   TempSrc: Oral   Weight: 102.2 kg (225 lb 3.2 oz)   Height: 5' 5" (1.651 m)     Physical Exam  Constitutional:       Appearance: Normal appearance. She is obese.   HENT:      Right Ear: Tympanic membrane normal.      Left Ear: Tympanic membrane normal.      Nose: Nose normal.      Mouth/Throat:      Mouth: Mucous membranes are moist.   Cardiovascular:      Rate and Rhythm: Normal rate and regular rhythm.      Pulses: Normal pulses.      Heart sounds: Normal heart sounds.   Pulmonary:      Breath sounds: Normal breath sounds.   Abdominal:      Palpations: Abdomen is soft.   Skin:     General: Skin is warm.   Neurological:      Mental Status: She is alert and oriented to person, place, and time.   Psychiatric:         Mood and Affect: Mood normal.         Behavior: Behavior normal.           Assessment:       1. Hyperlipidemia, unspecified hyperlipidemia type    2. Depression, unspecified depression type    3. Well woman exam    4. Encounter for screening for malignant neoplasm of breast, unspecified screening modality    5. Anemia, " unspecified type    6. Diabetes mellitus screening    7. Routine health maintenance    8. Positive depression screening           Plan:       Hyperlipidemia, unspecified hyperlipidemia type  -     LIPID PANEL; Future; Expected date: 01/10/2024    Depression, unspecified depression type  -     cariprazine (VRAYLAR) 1.5 mg Cap; Take 1 capsule (1.5 mg total) by mouth once daily.  Dispense: 30 capsule; Refill: 5  -     Ambulatory referral/consult to Psychiatry; Future; Expected date: 01/17/2024    Well woman exam  -     Ambulatory referral/consult to Obstetrics / Gynecology; Future; Expected date: 01/17/2024    Encounter for screening for malignant neoplasm of breast, unspecified screening modality  -     Mammo Digital Screening Bilat w/ Brandan; Future; Expected date: 01/10/2024    Anemia, unspecified type  -     CBC W/ AUTO DIFFERENTIAL; Future; Expected date: 01/10/2024    Diabetes mellitus screening  -     HEMOGLOBIN A1C; Future; Expected date: 01/10/2024    Routine health maintenance  -     HEMOGLOBIN A1C; Future; Expected date: 01/10/2024  -     COMPREHENSIVE METABOLIC PANEL; Future; Expected date: 01/10/2024  -     TSH; Future; Expected date: 01/10/2024  -     LIPID PANEL; Future; Expected date: 01/10/2024    Positive depression screening  Comments:  I have reviewed the positive depression score which warrants active treatment with psychotherapy and/or medications.      Follow up in about 6 months (around 7/10/2024), or if symptoms worsen or fail to improve.        1/10/2024 Laura Abdalla NP

## 2024-01-12 DIAGNOSIS — Z12.31 SCREENING MAMMOGRAM FOR HIGH-RISK PATIENT: Primary | ICD-10-CM

## 2024-01-23 ENCOUNTER — PATIENT MESSAGE (OUTPATIENT)
Dept: FAMILY MEDICINE | Facility: CLINIC | Age: 44
End: 2024-01-23
Payer: MEDICAID

## 2024-01-23 NOTE — TELEPHONE ENCOUNTER
Spoke with Ricarda with [employer] regard PA closure. Per Ricarda I will need to speak directly with [place] Medicaid department at [phone]. I proceed to contact [facility] at number provided and spoke with Carol with [employer]. PA reapplied and denied with additional supporting documents needed to be faxed to [phone]  which is the Issues Department, Per Carol. I was provided with Issues cases number of [id number] to attach/document with faxed supporting documents. Provider and pt notified and willing to await insurance approval. KELLIE

## 2024-01-31 ENCOUNTER — TELEPHONE (OUTPATIENT)
Dept: FAMILY MEDICINE | Facility: CLINIC | Age: 44
End: 2024-01-31
Payer: MEDICAID

## 2024-03-28 ENCOUNTER — HOSPITAL ENCOUNTER (EMERGENCY)
Facility: HOSPITAL | Age: 44
Discharge: HOME | End: 2024-03-28
Attending: EMERGENCY MEDICINE
Payer: MEDICAID

## 2024-03-28 VITALS
HEIGHT: 65 IN | RESPIRATION RATE: 16 BRPM | OXYGEN SATURATION: 100 % | BODY MASS INDEX: 38.32 KG/M2 | SYSTOLIC BLOOD PRESSURE: 160 MMHG | WEIGHT: 230 LBS | HEART RATE: 88 BPM | TEMPERATURE: 98 F | DIASTOLIC BLOOD PRESSURE: 78 MMHG

## 2024-03-28 DIAGNOSIS — R19.7 DIARRHEA, UNSPECIFIED TYPE: ICD-10-CM

## 2024-03-28 DIAGNOSIS — D64.9 ANEMIA, UNSPECIFIED TYPE: ICD-10-CM

## 2024-03-28 DIAGNOSIS — R11.2 NAUSEA AND VOMITING, UNSPECIFIED VOMITING TYPE: ICD-10-CM

## 2024-03-28 DIAGNOSIS — R10.13 EPIGASTRIC ABDOMINAL PAIN: Primary | ICD-10-CM

## 2024-03-28 DIAGNOSIS — R03.0 ELEVATED BLOOD PRESSURE READING: ICD-10-CM

## 2024-03-28 DIAGNOSIS — K80.20 CALCULUS OF GALLBLADDER WITHOUT CHOLECYSTITIS WITHOUT OBSTRUCTION: ICD-10-CM

## 2024-03-28 LAB
ALBUMIN SERPL BCP-MCNC: 3.9 G/DL (ref 3.5–5.2)
ALP SERPL-CCNC: 82 U/L (ref 55–135)
ALT SERPL W/O P-5'-P-CCNC: 51 U/L (ref 10–44)
ANION GAP SERPL CALC-SCNC: 12 MMOL/L (ref 8–16)
AST SERPL-CCNC: 27 U/L (ref 10–40)
BACTERIA #/AREA URNS HPF: ABNORMAL /HPF
BASOPHILS # BLD AUTO: 0.02 K/UL (ref 0–0.2)
BASOPHILS NFR BLD: 0.2 % (ref 0–1.9)
BILIRUB SERPL-MCNC: 0.3 MG/DL (ref 0.1–1)
BILIRUB UR QL STRIP: NEGATIVE
BUN SERPL-MCNC: 10 MG/DL (ref 6–20)
CALCIUM SERPL-MCNC: 9.6 MG/DL (ref 8.7–10.5)
CHLORIDE SERPL-SCNC: 108 MMOL/L (ref 95–110)
CLARITY UR: CLEAR
CO2 SERPL-SCNC: 21 MMOL/L (ref 23–29)
COLOR UR: YELLOW
CREAT SERPL-MCNC: 0.8 MG/DL (ref 0.5–1.4)
DIFFERENTIAL METHOD BLD: ABNORMAL
EOSINOPHIL # BLD AUTO: 0.1 K/UL (ref 0–0.5)
EOSINOPHIL NFR BLD: 1.4 % (ref 0–8)
ERYTHROCYTE [DISTWIDTH] IN BLOOD BY AUTOMATED COUNT: 19.6 % (ref 11.5–14.5)
EST. GFR  (NO RACE VARIABLE): >60 ML/MIN/1.73 M^2
FERRITIN SERPL-MCNC: 6 NG/ML (ref 20–300)
GLUCOSE SERPL-MCNC: 130 MG/DL (ref 70–110)
GLUCOSE UR QL STRIP: NEGATIVE
HCT VFR BLD AUTO: 30 % (ref 37–48.5)
HGB BLD-MCNC: 8.6 G/DL (ref 12–16)
HGB UR QL STRIP: ABNORMAL
HYALINE CASTS #/AREA URNS LPF: 1 /LPF
IMM GRANULOCYTES # BLD AUTO: 0.02 K/UL (ref 0–0.04)
IMM GRANULOCYTES NFR BLD AUTO: 0.2 % (ref 0–0.5)
KETONES UR QL STRIP: NEGATIVE
LEUKOCYTE ESTERASE UR QL STRIP: NEGATIVE
LIPASE SERPL-CCNC: 45 U/L (ref 4–60)
LYMPHOCYTES # BLD AUTO: 1.8 K/UL (ref 1–4.8)
LYMPHOCYTES NFR BLD: 19.6 % (ref 18–48)
MCH RBC QN AUTO: 20 PG (ref 27–31)
MCHC RBC AUTO-ENTMCNC: 28.7 G/DL (ref 32–36)
MCV RBC AUTO: 70 FL (ref 82–98)
MICROSCOPIC COMMENT: ABNORMAL
MONOCYTES # BLD AUTO: 0.5 K/UL (ref 0.3–1)
MONOCYTES NFR BLD: 6 % (ref 4–15)
NEUTROPHILS # BLD AUTO: 6.6 K/UL (ref 1.8–7.7)
NEUTROPHILS NFR BLD: 72.6 % (ref 38–73)
NITRITE UR QL STRIP: NEGATIVE
NRBC BLD-RTO: 0 /100 WBC
PH UR STRIP: 6 [PH] (ref 5–8)
PLATELET # BLD AUTO: 369 K/UL (ref 150–450)
PMV BLD AUTO: 10.1 FL (ref 9.2–12.9)
POTASSIUM SERPL-SCNC: 3.9 MMOL/L (ref 3.5–5.1)
PROT SERPL-MCNC: 8.1 G/DL (ref 6–8.4)
PROT UR QL STRIP: ABNORMAL
RBC # BLD AUTO: 4.31 M/UL (ref 4–5.4)
RBC #/AREA URNS HPF: 2 /HPF (ref 0–4)
SODIUM SERPL-SCNC: 141 MMOL/L (ref 136–145)
SP GR UR STRIP: 1.02 (ref 1–1.03)
URN SPEC COLLECT METH UR: ABNORMAL
UROBILINOGEN UR STRIP-ACNC: NEGATIVE EU/DL
WBC # BLD AUTO: 9.05 K/UL (ref 3.9–12.7)
WBC #/AREA URNS HPF: 8 /HPF (ref 0–5)

## 2024-03-28 PROCEDURE — 85025 COMPLETE CBC W/AUTO DIFF WBC: CPT | Performed by: EMERGENCY MEDICINE

## 2024-03-28 PROCEDURE — 82728 ASSAY OF FERRITIN: CPT | Performed by: EMERGENCY MEDICINE

## 2024-03-28 PROCEDURE — 96376 TX/PRO/DX INJ SAME DRUG ADON: CPT

## 2024-03-28 PROCEDURE — 96375 TX/PRO/DX INJ NEW DRUG ADDON: CPT

## 2024-03-28 PROCEDURE — 96361 HYDRATE IV INFUSION ADD-ON: CPT

## 2024-03-28 PROCEDURE — 83540 ASSAY OF IRON: CPT | Performed by: EMERGENCY MEDICINE

## 2024-03-28 PROCEDURE — 63600175 PHARM REV CODE 636 W HCPCS: Performed by: EMERGENCY MEDICINE

## 2024-03-28 PROCEDURE — 94760 N-INVAS EAR/PLS OXIMETRY 1: CPT

## 2024-03-28 PROCEDURE — 80053 COMPREHEN METABOLIC PANEL: CPT | Performed by: EMERGENCY MEDICINE

## 2024-03-28 PROCEDURE — 25000003 PHARM REV CODE 250: Performed by: EMERGENCY MEDICINE

## 2024-03-28 PROCEDURE — 81000 URINALYSIS NONAUTO W/SCOPE: CPT | Performed by: EMERGENCY MEDICINE

## 2024-03-28 PROCEDURE — 36415 COLL VENOUS BLD VENIPUNCTURE: CPT | Performed by: EMERGENCY MEDICINE

## 2024-03-28 PROCEDURE — 83690 ASSAY OF LIPASE: CPT | Performed by: EMERGENCY MEDICINE

## 2024-03-28 PROCEDURE — 99285 EMERGENCY DEPT VISIT HI MDM: CPT | Mod: 25

## 2024-03-28 PROCEDURE — 96374 THER/PROPH/DIAG INJ IV PUSH: CPT

## 2024-03-28 RX ORDER — ONDANSETRON HYDROCHLORIDE 2 MG/ML
4 INJECTION, SOLUTION INTRAVENOUS
Status: COMPLETED | OUTPATIENT
Start: 2024-03-28 | End: 2024-03-28

## 2024-03-28 RX ORDER — MORPHINE SULFATE 2 MG/ML
2 INJECTION, SOLUTION INTRAMUSCULAR; INTRAVENOUS
Status: COMPLETED | OUTPATIENT
Start: 2024-03-28 | End: 2024-03-28

## 2024-03-28 RX ORDER — ONDANSETRON 4 MG/1
4 TABLET, ORALLY DISINTEGRATING ORAL 2 TIMES DAILY
Qty: 20 TABLET | Refills: 0 | Status: SHIPPED | OUTPATIENT
Start: 2024-03-28

## 2024-03-28 RX ORDER — ACETAMINOPHEN 325 MG/1
650 TABLET ORAL
Status: DISCONTINUED | OUTPATIENT
Start: 2024-03-28 | End: 2024-03-28

## 2024-03-28 RX ORDER — FAMOTIDINE 20 MG/1
20 TABLET, FILM COATED ORAL 2 TIMES DAILY
Qty: 28 TABLET | Refills: 0 | Status: SHIPPED | OUTPATIENT
Start: 2024-03-28 | End: 2024-04-11

## 2024-03-28 RX ORDER — MORPHINE SULFATE 4 MG/ML
4 INJECTION, SOLUTION INTRAMUSCULAR; INTRAVENOUS
Status: COMPLETED | OUTPATIENT
Start: 2024-03-28 | End: 2024-03-28

## 2024-03-28 RX ORDER — HYDROCODONE BITARTRATE AND ACETAMINOPHEN 5; 325 MG/1; MG/1
1 TABLET ORAL EVERY 6 HOURS PRN
Qty: 12 TABLET | Refills: 0 | Status: SHIPPED | OUTPATIENT
Start: 2024-03-28 | End: 2024-03-31

## 2024-03-28 RX ORDER — IBUPROFEN 800 MG/1
800 TABLET ORAL EVERY 6 HOURS PRN
Qty: 20 TABLET | Refills: 0 | Status: SHIPPED | OUTPATIENT
Start: 2024-03-28

## 2024-03-28 RX ADMIN — MORPHINE SULFATE 4 MG: 4 INJECTION, SOLUTION INTRAMUSCULAR; INTRAVENOUS at 08:03

## 2024-03-28 RX ADMIN — ONDANSETRON 4 MG: 2 INJECTION INTRAMUSCULAR; INTRAVENOUS at 09:03

## 2024-03-28 RX ADMIN — MORPHINE SULFATE 2 MG: 2 INJECTION, SOLUTION INTRAMUSCULAR; INTRAVENOUS at 09:03

## 2024-03-28 RX ADMIN — SODIUM CHLORIDE 1000 ML: 9 INJECTION, SOLUTION INTRAVENOUS at 08:03

## 2024-03-28 RX ADMIN — ONDANSETRON 4 MG: 2 INJECTION INTRAMUSCULAR; INTRAVENOUS at 08:03

## 2024-03-28 NOTE — Clinical Note
"Raina Carvalhoha" Dalton was seen and treated in our emergency department on 3/28/2024.  She may return to work on 03/30/2024.       If you have any questions or concerns, please don't hesitate to call.      Abi Dubois MD"

## 2024-03-29 LAB
IRON SERPL-MCNC: 18 UG/DL (ref 30–160)
SATURATED IRON: 4 % (ref 20–50)
TOTAL IRON BINDING CAPACITY: 476 UG/DL (ref 250–450)
TRANSFERRIN SERPL-MCNC: 340 MG/DL (ref 200–375)

## 2024-03-29 NOTE — ED PROVIDER NOTES
Encounter Date: 3/28/2024       History     Chief Complaint   Patient presents with    Abdominal Pain     Epigastric pain x1 hr with bloating, and diarrhea      Emergent evaluation of a 43-year-old female with history of gallstones, status post to C-sections, tubal ligation, appendectomy presents to the ER due to 2 hours of epigastric abdominal pain right upper quadrant abdominal pain nausea with vomiting x6 and diarrhea x4 she reports that she ate spaghetti prior to arriving so she was unsure whether she was blood in her emesis.  Chills and sweats but no fever.  No urinary symptoms no known sick contacts.  No possibility of food poisoning.      Review of patient's allergies indicates:  No Known Allergies  Past Medical History:   Diagnosis Date    Anxiety     Depression      Past Surgical History:   Procedure Laterality Date    APPENDECTOMY       SECTION      EYE SURGERY      TUBAL LIGATION       Family History   Problem Relation Age of Onset    No Known Problems Mother     Hypertension Father     Thyroid disease Father      Social History     Tobacco Use    Smoking status: Never     Passive exposure: Never    Smokeless tobacco: Never   Substance Use Topics    Alcohol use: Yes     Comment: socially    Drug use: Never     Review of Systems   Constitutional:  Positive for activity change, appetite change, chills and diaphoresis. Negative for fatigue and fever.   Respiratory:  Negative for cough, chest tightness, shortness of breath, wheezing and stridor.    Cardiovascular:  Negative for chest pain and palpitations.   Gastrointestinal:  Positive for abdominal pain, diarrhea, nausea and vomiting. Negative for abdominal distention, blood in stool and constipation.   Genitourinary:  Negative for dysuria, flank pain, frequency, hematuria and urgency.   Musculoskeletal:  Negative for arthralgias, back pain, myalgias, neck pain and neck stiffness.   Skin:  Positive for pallor. Negative for rash.    Allergic/Immunologic: Negative for immunocompromised state.   Neurological:  Negative for dizziness, syncope, weakness, light-headedness and headaches.   Hematological:  Does not bruise/bleed easily.   Psychiatric/Behavioral:  Negative for confusion. The patient is not nervous/anxious.    All other systems reviewed and are negative.      Physical Exam     Initial Vitals [03/28/24 1921]   BP Pulse Resp Temp SpO2   (!) 200/95 77 20 98.2 °F (36.8 °C) 98 %      MAP       --         Physical Exam    Nursing note and vitals reviewed.  Constitutional: She appears well-developed and well-nourished. She is not diaphoretic. She appears distressed.   Patient was very uncomfortable in the room dry heaving and actively vomiting    HENT:   Head: Normocephalic and atraumatic.   Right Ear: External ear normal.   Left Ear: External ear normal.   Nose: Nose normal.   Mouth/Throat: Oropharynx is clear and moist.   Eyes: Conjunctivae and EOM are normal. Pupils are equal, round, and reactive to light.   Neck: Neck supple. No tracheal deviation present.   Normal range of motion.  Cardiovascular:  Normal rate, regular rhythm, normal heart sounds and intact distal pulses.     Exam reveals no gallop and no friction rub.       No murmur heard.  200/95 pulse 77   Pulmonary/Chest: Breath sounds normal. No stridor. No respiratory distress. She has no wheezes. She has no rhonchi. She has no rales. She exhibits no tenderness.   Abdominal: Abdomen is soft. Bowel sounds are normal. She exhibits distension. She exhibits no mass. There is abdominal tenderness.   Tenderness right upper quadrant and epigastrium no rebound or guarding.  Normal bowel sounds There is no rebound and no guarding.   Musculoskeletal:         General: No edema. Normal range of motion.      Cervical back: Normal range of motion and neck supple.     Neurological: She is alert and oriented to person, place, and time. She has normal strength. No cranial nerve deficit or sensory  deficit.   Skin: Skin is warm and dry. No rash noted. No erythema. There is pallor.   Psychiatric: She has a normal mood and affect. Her behavior is normal. Judgment and thought content normal.         ED Course   Procedures  Labs Reviewed   CBC W/ AUTO DIFFERENTIAL - Abnormal; Notable for the following components:       Result Value    Hemoglobin 8.6 (*)     Hematocrit 30.0 (*)     MCV 70 (*)     MCH 20.0 (*)     MCHC 28.7 (*)     RDW 19.6 (*)     All other components within normal limits   COMPREHENSIVE METABOLIC PANEL - Abnormal; Notable for the following components:    CO2 21 (*)     Glucose 130 (*)     ALT 51 (*)     All other components within normal limits   URINALYSIS, REFLEX TO URINE CULTURE - Abnormal; Notable for the following components:    Protein, UA 2+ (*)     Occult Blood UA Trace (*)     All other components within normal limits    Narrative:     Specimen Source->Urine   FERRITIN - Abnormal; Notable for the following components:    Ferritin 6 (*)     All other components within normal limits   URINALYSIS MICROSCOPIC - Abnormal; Notable for the following components:    WBC, UA 8 (*)     All other components within normal limits    Narrative:     Specimen Source->Urine   LIPASE   IRON AND TIBC          Imaging Results              US Abdomen Limited (In process)                      Medications   morphine injection 4 mg (4 mg Intravenous Given 3/28/24 2018)   ondansetron injection 4 mg (4 mg Intravenous Given 3/28/24 2017)   sodium chloride 0.9% bolus 1,000 mL 1,000 mL (0 mLs Intravenous Stopped 3/28/24 2237)   morphine injection 2 mg (2 mg Intravenous Given 3/28/24 2121)   ondansetron injection 4 mg (4 mg Intravenous Given 3/28/24 2120)     Medical Decision Making  Emergent evaluation of a 43-year-old female with history of gallstones, status post to C-sections, tubal ligation, appendectomy presents to the ER due to 2 hours of epigastric abdominal pain right upper quadrant abdominal pain nausea with  vomiting x6 and diarrhea x4 she reports that she ate spaghetti prior to arriving so she was unsure whether she was blood in her emesis.  Chills and sweats but no fever.  No urinary symptoms no known sick contacts.  No possibility of food poisoning.  On physical exam patient was very uncomfortable sitting up in bed dry heaving and vomit a small amount of emesis until bag.  She was overall pale mildly dry mucous membranes blood pressure 200/95 no history of hypertension normal respirations pulse 77 sats 98% temperature 98.2°.  She was tenderness to the epigastrium right upper quadrant positive Sal[place] sign normal bowel sounds no rebound or guarding.  Normal cardiac and lung exam  MDM    Patient presents for emergent evaluation of acute 2 hours epigastric right upper quadrant abdominal pain with nausea vomiting diarrhea history of gallstones that poses a possible threat to life and/or bodily function.    Differential diagnosis includes but is not limited to acute pancreatitis, acute cholecystitis and cholangitis, colitis, acute appendicitis, urinary tract infection, ovarian or testicular torsion, if female PID, TOA, if male epididymitis and orchitis, prostatitis, pyelonephritis, kidney stone, volvulus, small bowel obstruction, enteritis, gastritis, colitis, mesenteric ischemia, AAA, AAA rupture, aortic dissection, constipation, upper or lower gi bleeding, traumatic injury.   .  In the ED patient found to have acute cholelithiasis with nausea vomiting diarrhea right upper quadrant abdominal pain anemia elevated blood pressure  I ordered labs and personally reviewed them.  Labs significant for see below  I ordered ultrasound scan and personally reviewed it and reviewed the radiologist interpretation.  Ultrasound right upper quadrant significant for cholelithiasis no second-degree findings of acute cholecystitis CBD 5 mm     MDM  Patient was managed in the ED with IV normal saline, 4 mg of morphine, 4 mg of Zofran pain is  improved blood pressure is now 154 systolic no longer vomiting stable for discharge home she will follow up with  gen surg if symptoms continue she will be discharged home with Zofran, Norco, ibuprofen and Pepcid  The response to treatment was good.        Abi Dubois M.D.  8:18 PM 3/28/2024        Amount and/or Complexity of Data Reviewed  Labs: ordered.  Radiology: ordered.    Risk  Prescription drug management.               ED Course as of 03/28/24 2238   Thu Mar 28, 2024   2057 CBC with H&H of 8.6 and 30 MCV of 70 [RM]      ED Course User Index  [RM] Abi Dubois MD                           Clinical Impression:  Final diagnoses:  [R10.13] Epigastric abdominal pain (Primary)  [R11.2] Nausea and vomiting, unspecified vomiting type  [R19.7] Diarrhea, unspecified type  [D64.9] Anemia, unspecified type  [R03.0] Elevated blood pressure reading  [K80.20] Calculus of gallbladder without cholecystitis without obstruction          ED Disposition Condition    Discharge Stable          ED Prescriptions       Medication Sig Dispense Start Date End Date Auth. Provider    famotidine (PEPCID) 20 MG tablet Take 1 tablet (20 mg total) by mouth 2 (two) times daily. for 14 days 28 tablet 3/28/2024 4/11/2024 Abi Dubois MD    HYDROcodone-acetaminophen (NORCO) 5-325 mg per tablet Take 1 tablet by mouth every 6 (six) hours as needed for Pain. 12 tablet 3/28/2024 3/31/2024 Abi Dubois MD    ibuprofen (ADVIL,MOTRIN) 800 MG tablet Take 1 tablet (800 mg total) by mouth every 6 (six) hours as needed for Pain. 20 tablet 3/28/2024 -- Abi Dubois MD    ondansetron (ZOFRAN-ODT) 4 MG TbDL Take 1 tablet (4 mg total) by mouth 2 (two) times daily. 20 tablet 3/28/2024 -- Abi Dubois MD          Follow-up Information       Follow up With Specialties Details Why Contact Info Additional Information    [facility] Go to  If symptoms  [address] [place] [address] 1st floor    Laura Abdalla, FNP-C Family Medicine Schedule an appointment as soon as possible for a visit in 1 day If your symptoms do not improve [address] [facility] [address]  [place] [address]  [phone]       Jayden Mccullough III, MD General Surgery, Surgery Schedule an appointment as soon as possible for a visit  If your symptoms do not improve [address] [facility] [address]  [place] [address]  [phone]                Abi Dubois MD  03/28/24 3115

## 2024-03-29 NOTE — ED NOTES
Raina Hoffman presents to the ED with c/o epigastric pain that started at 1800. Associated symptoms are nausea, vomiting and diarrhea. Mucous membranes are pink and moist. Skin is warm, dry and intact.  CHERYL VSS  Verified patient's name and date of birth.

## 2024-03-29 NOTE — ED NOTES
Patient has been assisted to the restroom via wheel chair. Her mother is in the restroom to assist patient. CCMS instructions have been given. Patient verbalized understanding.

## 2024-03-29 NOTE — ED NOTES
Upon discharge, patient is AAOx4, no cardiac or respiratory complications. Follow up care and  Medications have been reviewed with patient and has been instructed to return to the ER if needed. Patient verbalized understanding and ambulated to the lobby without difficulty. JAX LUNA.

## 2024-04-05 ENCOUNTER — HOSPITAL ENCOUNTER (OUTPATIENT)
Dept: RADIOLOGY | Facility: CLINIC | Age: 44
Discharge: HOME | End: 2024-04-05
Attending: NURSE PRACTITIONER
Payer: MEDICAID

## 2024-04-05 ENCOUNTER — HOSPITAL ENCOUNTER (OUTPATIENT)
Dept: RADIOLOGY | Facility: HOSPITAL | Age: 44
Discharge: HOME | End: 2024-04-05
Attending: NURSE PRACTITIONER
Payer: MEDICAID

## 2024-04-05 VITALS — WEIGHT: 229.94 LBS | HEIGHT: 65 IN | BODY MASS INDEX: 38.31 KG/M2

## 2024-04-05 DIAGNOSIS — Z12.31 ENCOUNTER FOR SCREENING MAMMOGRAM FOR BREAST CANCER: ICD-10-CM

## 2024-04-05 PROCEDURE — 77063 BREAST TOMOSYNTHESIS BI: CPT | Mod: TC,PO

## 2024-04-13 DIAGNOSIS — F32.A DEPRESSION, UNSPECIFIED DEPRESSION TYPE: ICD-10-CM

## 2024-04-15 RX ORDER — CARIPRAZINE 1.5 MG/1
CAPSULE, GELATIN COATED ORAL
Qty: 30 CAPSULE | Refills: 5 | Status: SHIPPED | OUTPATIENT
Start: 2024-04-15

## 2024-04-22 DIAGNOSIS — F32.A DEPRESSION, UNSPECIFIED DEPRESSION TYPE: ICD-10-CM

## 2024-04-23 RX ORDER — SERTRALINE HYDROCHLORIDE 100 MG/1
TABLET, FILM COATED ORAL
Qty: 30 TABLET | Refills: 2 | Status: SHIPPED | OUTPATIENT
Start: 2024-04-23

## 2024-07-13 ENCOUNTER — PATIENT MESSAGE (OUTPATIENT)
Dept: FAMILY MEDICINE | Facility: CLINIC | Age: 44
End: 2024-07-13
Payer: MEDICAID

## 2024-08-08 DIAGNOSIS — F32.A DEPRESSION, UNSPECIFIED DEPRESSION TYPE: ICD-10-CM

## 2024-08-08 RX ORDER — SERTRALINE HYDROCHLORIDE 100 MG/1
TABLET, FILM COATED ORAL
Qty: 30 TABLET | Refills: 2 | Status: SHIPPED | OUTPATIENT
Start: 2024-08-08

## 2024-08-13 DIAGNOSIS — F32.A DEPRESSION, UNSPECIFIED DEPRESSION TYPE: ICD-10-CM

## 2024-08-13 RX ORDER — CARIPRAZINE 1.5 MG/1
CAPSULE, GELATIN COATED ORAL
Qty: 30 CAPSULE | Refills: 5 | Status: SHIPPED | OUTPATIENT
Start: 2024-08-13

## 2024-09-12 ENCOUNTER — HOSPITAL ENCOUNTER (INPATIENT)
Facility: HOSPITAL | Age: 44
LOS: 2 days | Discharge: HOME | DRG: 418 | End: 2024-09-14
Attending: EMERGENCY MEDICINE | Admitting: INTERNAL MEDICINE
Payer: MEDICAID

## 2024-09-12 DIAGNOSIS — K80.00 ACUTE CALCULOUS CHOLECYSTITIS: Primary | ICD-10-CM

## 2024-09-12 DIAGNOSIS — R10.11 RIGHT UPPER QUADRANT ABDOMINAL PAIN: ICD-10-CM

## 2024-09-12 DIAGNOSIS — R07.9 CHEST PAIN: ICD-10-CM

## 2024-09-12 DIAGNOSIS — R11.2 NAUSEA AND VOMITING, UNSPECIFIED VOMITING TYPE: ICD-10-CM

## 2024-09-12 DIAGNOSIS — R74.01 TRANSAMINITIS: ICD-10-CM

## 2024-09-12 DIAGNOSIS — K80.50 CHOLEDOCHOLITHIASIS: ICD-10-CM

## 2024-09-12 LAB
B-HCG UR QL: NEGATIVE
CTP QC/QA: YES

## 2024-09-12 PROCEDURE — 25000003 PHARM REV CODE 250: Performed by: EMERGENCY MEDICINE

## 2024-09-12 PROCEDURE — 99285 EMERGENCY DEPT VISIT HI MDM: CPT | Mod: 25

## 2024-09-12 PROCEDURE — 96361 HYDRATE IV INFUSION ADD-ON: CPT

## 2024-09-12 PROCEDURE — 81000 URINALYSIS NONAUTO W/SCOPE: CPT | Performed by: EMERGENCY MEDICINE

## 2024-09-12 PROCEDURE — 63600175 PHARM REV CODE 636 W HCPCS: Performed by: EMERGENCY MEDICINE

## 2024-09-12 PROCEDURE — 81025 URINE PREGNANCY TEST: CPT | Performed by: EMERGENCY MEDICINE

## 2024-09-12 PROCEDURE — 11000001 HC ACUTE MED/SURG PRIVATE ROOM

## 2024-09-12 PROCEDURE — 96375 TX/PRO/DX INJ NEW DRUG ADDON: CPT

## 2024-09-12 RX ORDER — MORPHINE SULFATE 4 MG/ML
4 INJECTION, SOLUTION INTRAMUSCULAR; INTRAVENOUS
Status: COMPLETED | OUTPATIENT
Start: 2024-09-12 | End: 2024-09-12

## 2024-09-12 RX ORDER — ONDANSETRON HYDROCHLORIDE 2 MG/ML
4 INJECTION, SOLUTION INTRAVENOUS
Status: COMPLETED | OUTPATIENT
Start: 2024-09-12 | End: 2024-09-12

## 2024-09-12 RX ADMIN — MORPHINE SULFATE 4 MG: 4 INJECTION INTRAVENOUS at 11:09

## 2024-09-12 RX ADMIN — SODIUM CHLORIDE 1000 ML: 9 INJECTION, SOLUTION INTRAVENOUS at 11:09

## 2024-09-12 RX ADMIN — ONDANSETRON 4 MG: 2 INJECTION INTRAMUSCULAR; INTRAVENOUS at 11:09

## 2024-09-13 ENCOUNTER — ANESTHESIA EVENT (OUTPATIENT)
Dept: SURGERY | Facility: HOSPITAL | Age: 44
End: 2024-09-13
Payer: MEDICAID

## 2024-09-13 ENCOUNTER — ANESTHESIA (OUTPATIENT)
Dept: SURGERY | Facility: HOSPITAL | Age: 44
End: 2024-09-13
Payer: MEDICAID

## 2024-09-13 PROBLEM — K80.00 ACUTE CALCULOUS CHOLECYSTITIS: Status: ACTIVE | Noted: 2024-09-13

## 2024-09-13 PROBLEM — F32.A DEPRESSION: Status: ACTIVE | Noted: 2024-09-13

## 2024-09-13 PROBLEM — K80.42 CHOLEDOCHOLITHIASIS WITH ACUTE CHOLECYSTITIS: Status: ACTIVE | Noted: 2024-09-13

## 2024-09-13 LAB
ALBUMIN SERPL BCP-MCNC: 3.9 G/DL (ref 3.5–5.2)
ALP SERPL-CCNC: 91 U/L (ref 55–135)
ALT SERPL W/O P-5'-P-CCNC: 88 U/L (ref 10–44)
ANION GAP SERPL CALC-SCNC: 14 MMOL/L (ref 8–16)
AST SERPL-CCNC: 63 U/L (ref 10–40)
BACTERIA #/AREA URNS HPF: ABNORMAL /HPF
BASOPHILS # BLD AUTO: 0.02 K/UL (ref 0–0.2)
BASOPHILS NFR BLD: 0.3 % (ref 0–1.9)
BILIRUB SERPL-MCNC: 0.3 MG/DL (ref 0.1–1)
BILIRUB UR QL STRIP: NEGATIVE
BUN SERPL-MCNC: 13 MG/DL (ref 6–20)
CALCIUM SERPL-MCNC: 9.5 MG/DL (ref 8.7–10.5)
CAOX CRY URNS QL MICRO: ABNORMAL
CHLORIDE SERPL-SCNC: 107 MMOL/L (ref 95–110)
CLARITY UR: ABNORMAL
CO2 SERPL-SCNC: 20 MMOL/L (ref 23–29)
COLOR UR: YELLOW
CREAT SERPL-MCNC: 0.8 MG/DL (ref 0.5–1.4)
DIFFERENTIAL METHOD BLD: ABNORMAL
EOSINOPHIL # BLD AUTO: 0.1 K/UL (ref 0–0.5)
EOSINOPHIL NFR BLD: 0.8 % (ref 0–8)
ERYTHROCYTE [DISTWIDTH] IN BLOOD BY AUTOMATED COUNT: 19.4 % (ref 11.5–14.5)
EST. GFR  (NO RACE VARIABLE): >60 ML/MIN/1.73 M^2
GLUCOSE SERPL-MCNC: 126 MG/DL (ref 70–110)
GLUCOSE UR QL STRIP: NEGATIVE
HCT VFR BLD AUTO: 35.1 % (ref 37–48.5)
HGB BLD-MCNC: 10.3 G/DL (ref 12–16)
HGB UR QL STRIP: ABNORMAL
HYALINE CASTS #/AREA URNS LPF: 20 /LPF
IMM GRANULOCYTES # BLD AUTO: 0.01 K/UL (ref 0–0.04)
IMM GRANULOCYTES NFR BLD AUTO: 0.1 % (ref 0–0.5)
KETONES UR QL STRIP: NEGATIVE
LEUKOCYTE ESTERASE UR QL STRIP: NEGATIVE
LIPASE SERPL-CCNC: 48 U/L (ref 4–60)
LYMPHOCYTES # BLD AUTO: 0.9 K/UL (ref 1–4.8)
LYMPHOCYTES NFR BLD: 11.7 % (ref 18–48)
MCH RBC QN AUTO: 21.5 PG (ref 27–31)
MCHC RBC AUTO-ENTMCNC: 29.3 G/DL (ref 32–36)
MCV RBC AUTO: 73 FL (ref 82–98)
MICROSCOPIC COMMENT: ABNORMAL
MONOCYTES # BLD AUTO: 0.4 K/UL (ref 0.3–1)
MONOCYTES NFR BLD: 5.2 % (ref 4–15)
NEUTROPHILS # BLD AUTO: 6 K/UL (ref 1.8–7.7)
NEUTROPHILS NFR BLD: 81.9 % (ref 38–73)
NITRITE UR QL STRIP: NEGATIVE
NRBC BLD-RTO: 0 /100 WBC
PH UR STRIP: 6 [PH] (ref 5–8)
PLATELET # BLD AUTO: 299 K/UL (ref 150–450)
PMV BLD AUTO: 10.4 FL (ref 9.2–12.9)
POTASSIUM SERPL-SCNC: 4.1 MMOL/L (ref 3.5–5.1)
PROT SERPL-MCNC: 7.9 G/DL (ref 6–8.4)
PROT UR QL STRIP: ABNORMAL
RBC # BLD AUTO: 4.79 M/UL (ref 4–5.4)
RBC #/AREA URNS HPF: 3 /HPF (ref 0–4)
SODIUM SERPL-SCNC: 141 MMOL/L (ref 136–145)
SP GR UR STRIP: >1.03 (ref 1–1.03)
SQUAMOUS #/AREA URNS HPF: 3 /HPF
URN SPEC COLLECT METH UR: ABNORMAL
UROBILINOGEN UR STRIP-ACNC: NEGATIVE EU/DL
WBC # BLD AUTO: 7.35 K/UL (ref 3.9–12.7)
WBC #/AREA URNS HPF: 2 /HPF (ref 0–5)

## 2024-09-13 PROCEDURE — 27201423 OPTIME MED/SURG SUP & DEVICES STERILE SUPPLY: Performed by: STUDENT IN AN ORGANIZED HEALTH CARE EDUCATION/TRAINING PROGRAM

## 2024-09-13 PROCEDURE — 63600175 PHARM REV CODE 636 W HCPCS: Performed by: EMERGENCY MEDICINE

## 2024-09-13 PROCEDURE — 25000003 PHARM REV CODE 250

## 2024-09-13 PROCEDURE — 25000003 PHARM REV CODE 250: Performed by: EMERGENCY MEDICINE

## 2024-09-13 PROCEDURE — 63600175 PHARM REV CODE 636 W HCPCS: Mod: JZ,JG | Performed by: STUDENT IN AN ORGANIZED HEALTH CARE EDUCATION/TRAINING PROGRAM

## 2024-09-13 PROCEDURE — 63600175 PHARM REV CODE 636 W HCPCS: Performed by: HOSPITALIST

## 2024-09-13 PROCEDURE — 96365 THER/PROPH/DIAG IV INF INIT: CPT

## 2024-09-13 PROCEDURE — 36000708 HC OR TIME LEV III 1ST 15 MIN: Performed by: STUDENT IN AN ORGANIZED HEALTH CARE EDUCATION/TRAINING PROGRAM

## 2024-09-13 PROCEDURE — 36415 COLL VENOUS BLD VENIPUNCTURE: CPT | Performed by: EMERGENCY MEDICINE

## 2024-09-13 PROCEDURE — 37000009 HC ANESTHESIA EA ADD 15 MINS: Performed by: STUDENT IN AN ORGANIZED HEALTH CARE EDUCATION/TRAINING PROGRAM

## 2024-09-13 PROCEDURE — 36000709 HC OR TIME LEV III EA ADD 15 MIN: Performed by: STUDENT IN AN ORGANIZED HEALTH CARE EDUCATION/TRAINING PROGRAM

## 2024-09-13 PROCEDURE — 99223 1ST HOSP IP/OBS HIGH 75: CPT | Mod: 57,ICN,, | Performed by: STUDENT IN AN ORGANIZED HEALTH CARE EDUCATION/TRAINING PROGRAM

## 2024-09-13 PROCEDURE — 37000008 HC ANESTHESIA 1ST 15 MINUTES: Performed by: STUDENT IN AN ORGANIZED HEALTH CARE EDUCATION/TRAINING PROGRAM

## 2024-09-13 PROCEDURE — 27000221 HC OXYGEN, UP TO 24 HOURS

## 2024-09-13 PROCEDURE — 63600175 PHARM REV CODE 636 W HCPCS: Performed by: STUDENT IN AN ORGANIZED HEALTH CARE EDUCATION/TRAINING PROGRAM

## 2024-09-13 PROCEDURE — 25000003 PHARM REV CODE 250: Performed by: ANESTHESIOLOGY

## 2024-09-13 PROCEDURE — 71000033 HC RECOVERY, INTIAL HOUR: Performed by: STUDENT IN AN ORGANIZED HEALTH CARE EDUCATION/TRAINING PROGRAM

## 2024-09-13 PROCEDURE — 47562 LAPAROSCOPIC CHOLECYSTECTOMY: CPT | Mod: ,,, | Performed by: STUDENT IN AN ORGANIZED HEALTH CARE EDUCATION/TRAINING PROGRAM

## 2024-09-13 PROCEDURE — 71000039 HC RECOVERY, EACH ADD'L HOUR: Performed by: STUDENT IN AN ORGANIZED HEALTH CARE EDUCATION/TRAINING PROGRAM

## 2024-09-13 PROCEDURE — 63600175 PHARM REV CODE 636 W HCPCS: Performed by: ANESTHESIOLOGY

## 2024-09-13 PROCEDURE — 99900031 HC PATIENT EDUCATION (STAT)

## 2024-09-13 PROCEDURE — 63600175 PHARM REV CODE 636 W HCPCS: Performed by: NURSE ANESTHETIST, CERTIFIED REGISTERED

## 2024-09-13 PROCEDURE — 25000003 PHARM REV CODE 250: Performed by: NURSE ANESTHETIST, CERTIFIED REGISTERED

## 2024-09-13 PROCEDURE — 25000003 PHARM REV CODE 250: Performed by: HOSPITALIST

## 2024-09-13 PROCEDURE — 96376 TX/PRO/DX INJ SAME DRUG ADON: CPT

## 2024-09-13 PROCEDURE — 83690 ASSAY OF LIPASE: CPT | Performed by: EMERGENCY MEDICINE

## 2024-09-13 PROCEDURE — 85025 COMPLETE CBC W/AUTO DIFF WBC: CPT | Performed by: EMERGENCY MEDICINE

## 2024-09-13 PROCEDURE — 25000003 PHARM REV CODE 250: Performed by: NURSE PRACTITIONER

## 2024-09-13 PROCEDURE — 0FT44ZZ RESECTION OF GALLBLADDER, PERCUTANEOUS ENDOSCOPIC APPROACH: ICD-10-PCS | Performed by: STUDENT IN AN ORGANIZED HEALTH CARE EDUCATION/TRAINING PROGRAM

## 2024-09-13 PROCEDURE — 94761 N-INVAS EAR/PLS OXIMETRY MLT: CPT

## 2024-09-13 PROCEDURE — 12000002 HC ACUTE/MED SURGE SEMI-PRIVATE ROOM

## 2024-09-13 PROCEDURE — 80053 COMPREHEN METABOLIC PANEL: CPT | Performed by: EMERGENCY MEDICINE

## 2024-09-13 PROCEDURE — 63600175 PHARM REV CODE 636 W HCPCS

## 2024-09-13 RX ORDER — PROPOFOL 10 MG/ML
VIAL (ML) INTRAVENOUS
Status: DISCONTINUED | OUTPATIENT
Start: 2024-09-13 | End: 2024-09-13

## 2024-09-13 RX ORDER — SODIUM CHLORIDE 9 MG/ML
INJECTION, SOLUTION INTRAVENOUS CONTINUOUS
Status: DISCONTINUED | OUTPATIENT
Start: 2024-09-13 | End: 2024-09-14 | Stop reason: HOSPADM

## 2024-09-13 RX ORDER — FAMOTIDINE 10 MG/ML
INJECTION INTRAVENOUS
Status: DISCONTINUED | OUTPATIENT
Start: 2024-09-13 | End: 2024-09-13

## 2024-09-13 RX ORDER — LIDOCAINE HYDROCHLORIDE 10 MG/ML
INJECTION, SOLUTION EPIDURAL; INFILTRATION; INTRACAUDAL; PERINEURAL
Status: DISCONTINUED | OUTPATIENT
Start: 2024-09-13 | End: 2024-09-13

## 2024-09-13 RX ORDER — GLUCAGON 1 MG
1 KIT INJECTION
Status: DISCONTINUED | OUTPATIENT
Start: 2024-09-13 | End: 2024-09-13 | Stop reason: HOSPADM

## 2024-09-13 RX ORDER — ONDANSETRON HYDROCHLORIDE 2 MG/ML
4 INJECTION, SOLUTION INTRAVENOUS DAILY PRN
Status: DISCONTINUED | OUTPATIENT
Start: 2024-09-13 | End: 2024-09-13 | Stop reason: HOSPADM

## 2024-09-13 RX ORDER — MEPERIDINE HYDROCHLORIDE 50 MG/ML
12.5 INJECTION INTRAMUSCULAR; INTRAVENOUS; SUBCUTANEOUS EVERY 10 MIN PRN
Status: DISCONTINUED | OUTPATIENT
Start: 2024-09-13 | End: 2024-09-13 | Stop reason: HOSPADM

## 2024-09-13 RX ORDER — BUPIVACAINE HYDROCHLORIDE 2.5 MG/ML
INJECTION, SOLUTION EPIDURAL; INFILTRATION; INTRACAUDAL
Status: DISCONTINUED | OUTPATIENT
Start: 2024-09-13 | End: 2024-09-13 | Stop reason: HOSPADM

## 2024-09-13 RX ORDER — FENTANYL CITRATE 50 UG/ML
INJECTION, SOLUTION INTRAMUSCULAR; INTRAVENOUS
Status: DISCONTINUED | OUTPATIENT
Start: 2024-09-13 | End: 2024-09-13

## 2024-09-13 RX ORDER — HYDROCODONE BITARTRATE AND ACETAMINOPHEN 5; 325 MG/1; MG/1
1 TABLET ORAL EVERY 6 HOURS PRN
Status: DISCONTINUED | OUTPATIENT
Start: 2024-09-13 | End: 2024-09-14 | Stop reason: HOSPADM

## 2024-09-13 RX ORDER — MORPHINE SULFATE 4 MG/ML
4 INJECTION, SOLUTION INTRAMUSCULAR; INTRAVENOUS EVERY 4 HOURS PRN
Status: DISCONTINUED | OUTPATIENT
Start: 2024-09-13 | End: 2024-09-14 | Stop reason: HOSPADM

## 2024-09-13 RX ORDER — NALOXONE HCL 0.4 MG/ML
0.02 VIAL (ML) INJECTION
Status: DISCONTINUED | OUTPATIENT
Start: 2024-09-13 | End: 2024-09-14 | Stop reason: HOSPADM

## 2024-09-13 RX ORDER — KETAMINE HCL IN 0.9 % NACL 50 MG/5 ML
SYRINGE (ML) INTRAVENOUS
Status: DISCONTINUED | OUTPATIENT
Start: 2024-09-13 | End: 2024-09-13

## 2024-09-13 RX ORDER — OXYCODONE HYDROCHLORIDE 5 MG/1
5 TABLET ORAL
Status: DISCONTINUED | OUTPATIENT
Start: 2024-09-13 | End: 2024-09-13 | Stop reason: HOSPADM

## 2024-09-13 RX ORDER — ACETAMINOPHEN 325 MG/1
650 TABLET ORAL EVERY 6 HOURS PRN
Status: DISCONTINUED | OUTPATIENT
Start: 2024-09-13 | End: 2024-09-14 | Stop reason: HOSPADM

## 2024-09-13 RX ORDER — LABETALOL HYDROCHLORIDE 5 MG/ML
INJECTION, SOLUTION INTRAVENOUS
Status: DISCONTINUED | OUTPATIENT
Start: 2024-09-13 | End: 2024-09-13

## 2024-09-13 RX ORDER — ONDANSETRON HYDROCHLORIDE 2 MG/ML
INJECTION, SOLUTION INTRAVENOUS
Status: DISCONTINUED | OUTPATIENT
Start: 2024-09-13 | End: 2024-09-13

## 2024-09-13 RX ORDER — SODIUM CHLORIDE, SODIUM LACTATE, POTASSIUM CHLORIDE, CALCIUM CHLORIDE 600; 310; 30; 20 MG/100ML; MG/100ML; MG/100ML; MG/100ML
INJECTION, SOLUTION INTRAVENOUS CONTINUOUS PRN
Status: DISCONTINUED | OUTPATIENT
Start: 2024-09-13 | End: 2024-09-13

## 2024-09-13 RX ORDER — MORPHINE SULFATE 4 MG/ML
4 INJECTION, SOLUTION INTRAMUSCULAR; INTRAVENOUS
Status: COMPLETED | OUTPATIENT
Start: 2024-09-13 | End: 2024-09-13

## 2024-09-13 RX ORDER — DEXAMETHASONE SODIUM PHOSPHATE 4 MG/ML
INJECTION, SOLUTION INTRA-ARTICULAR; INTRALESIONAL; INTRAMUSCULAR; INTRAVENOUS; SOFT TISSUE
Status: DISCONTINUED | OUTPATIENT
Start: 2024-09-13 | End: 2024-09-13

## 2024-09-13 RX ORDER — MIDAZOLAM HYDROCHLORIDE 1 MG/ML
INJECTION INTRAMUSCULAR; INTRAVENOUS
Status: DISCONTINUED | OUTPATIENT
Start: 2024-09-13 | End: 2024-09-13

## 2024-09-13 RX ORDER — SUCCINYLCHOLINE CHLORIDE 20 MG/ML
INJECTION INTRAMUSCULAR; INTRAVENOUS
Status: DISCONTINUED | OUTPATIENT
Start: 2024-09-13 | End: 2024-09-13

## 2024-09-13 RX ORDER — METOCLOPRAMIDE HYDROCHLORIDE 5 MG/ML
5 INJECTION INTRAMUSCULAR; INTRAVENOUS
Status: COMPLETED | OUTPATIENT
Start: 2024-09-13 | End: 2024-09-13

## 2024-09-13 RX ORDER — ONDANSETRON HYDROCHLORIDE 2 MG/ML
4 INJECTION, SOLUTION INTRAVENOUS EVERY 6 HOURS PRN
Status: DISCONTINUED | OUTPATIENT
Start: 2024-09-13 | End: 2024-09-14 | Stop reason: HOSPADM

## 2024-09-13 RX ORDER — DIPHENHYDRAMINE HYDROCHLORIDE 50 MG/ML
12.5 INJECTION INTRAMUSCULAR; INTRAVENOUS
Status: DISCONTINUED | OUTPATIENT
Start: 2024-09-13 | End: 2024-09-13 | Stop reason: HOSPADM

## 2024-09-13 RX ORDER — ROCURONIUM BROMIDE 10 MG/ML
INJECTION, SOLUTION INTRAVENOUS
Status: DISCONTINUED | OUTPATIENT
Start: 2024-09-13 | End: 2024-09-13

## 2024-09-13 RX ORDER — HYDROMORPHONE HYDROCHLORIDE 1 MG/ML
0.2 INJECTION, SOLUTION INTRAMUSCULAR; INTRAVENOUS; SUBCUTANEOUS EVERY 5 MIN PRN
Status: DISCONTINUED | OUTPATIENT
Start: 2024-09-13 | End: 2024-09-13 | Stop reason: HOSPADM

## 2024-09-13 RX ADMIN — LIDOCAINE HYDROCHLORIDE 50 MG: 10 INJECTION, SOLUTION EPIDURAL; INFILTRATION; INTRACAUDAL; PERINEURAL at 12:09

## 2024-09-13 RX ADMIN — ONDANSETRON 4 MG: 2 INJECTION INTRAMUSCULAR; INTRAVENOUS at 02:09

## 2024-09-13 RX ADMIN — Medication 20 MG: at 01:09

## 2024-09-13 RX ADMIN — ROCURONIUM BROMIDE 5 MG: 10 INJECTION, SOLUTION INTRAVENOUS at 12:09

## 2024-09-13 RX ADMIN — PIPERACILLIN SODIUM AND TAZOBACTAM SODIUM 4.5 G: 4; .5 INJECTION, POWDER, FOR SOLUTION INTRAVENOUS at 03:09

## 2024-09-13 RX ADMIN — SUGAMMADEX 200 MG: 100 INJECTION, SOLUTION INTRAVENOUS at 02:09

## 2024-09-13 RX ADMIN — OXYCODONE HYDROCHLORIDE 5 MG: 5 TABLET ORAL at 02:09

## 2024-09-13 RX ADMIN — HYDROMORPHONE HYDROCHLORIDE 0.2 MG: 1 INJECTION, SOLUTION INTRAMUSCULAR; INTRAVENOUS; SUBCUTANEOUS at 03:09

## 2024-09-13 RX ADMIN — MIDAZOLAM HYDROCHLORIDE 1 MG: 1 INJECTION, SOLUTION INTRAMUSCULAR; INTRAVENOUS at 12:09

## 2024-09-13 RX ADMIN — HYDROMORPHONE HYDROCHLORIDE 0.2 MG: 1 INJECTION, SOLUTION INTRAMUSCULAR; INTRAVENOUS; SUBCUTANEOUS at 02:09

## 2024-09-13 RX ADMIN — SODIUM CHLORIDE, SODIUM LACTATE, POTASSIUM CHLORIDE, AND CALCIUM CHLORIDE: .6; .31; .03; .02 INJECTION, SOLUTION INTRAVENOUS at 01:09

## 2024-09-13 RX ADMIN — MORPHINE SULFATE 4 MG: 4 INJECTION INTRAVENOUS at 02:09

## 2024-09-13 RX ADMIN — Medication 120 MG: at 12:09

## 2024-09-13 RX ADMIN — DEXAMETHASONE SODIUM PHOSPHATE 4 MG: 4 INJECTION, SOLUTION INTRAMUSCULAR; INTRAVENOUS at 01:09

## 2024-09-13 RX ADMIN — MORPHINE SULFATE 4 MG: 4 INJECTION, SOLUTION INTRAMUSCULAR; INTRAVENOUS at 07:09

## 2024-09-13 RX ADMIN — PROPOFOL 160 MG: 10 INJECTION, EMULSION INTRAVENOUS at 12:09

## 2024-09-13 RX ADMIN — LABETALOL HYDROCHLORIDE 5 MG: 5 INJECTION, SOLUTION INTRAVENOUS at 02:09

## 2024-09-13 RX ADMIN — PROPOFOL 40 MG: 10 INJECTION, EMULSION INTRAVENOUS at 01:09

## 2024-09-13 RX ADMIN — METOCLOPRAMIDE 5 MG: 5 INJECTION, SOLUTION INTRAMUSCULAR; INTRAVENOUS at 02:09

## 2024-09-13 RX ADMIN — SODIUM CHLORIDE: 9 INJECTION, SOLUTION INTRAVENOUS at 08:09

## 2024-09-13 RX ADMIN — ROCURONIUM BROMIDE 35 MG: 10 INJECTION, SOLUTION INTRAVENOUS at 12:09

## 2024-09-13 RX ADMIN — FENTANYL CITRATE 50 MCG: 50 INJECTION, SOLUTION INTRAMUSCULAR; INTRAVENOUS at 01:09

## 2024-09-13 RX ADMIN — PIPERACILLIN AND TAZOBACTAM 4.5 G: 4; .5 INJECTION, POWDER, LYOPHILIZED, FOR SOLUTION INTRAVENOUS; PARENTERAL at 08:09

## 2024-09-13 RX ADMIN — HYDROCODONE BITARTRATE AND ACETAMINOPHEN 1 TABLET: 5; 325 TABLET ORAL at 06:09

## 2024-09-13 RX ADMIN — FENTANYL CITRATE 50 MCG: 50 INJECTION, SOLUTION INTRAMUSCULAR; INTRAVENOUS at 12:09

## 2024-09-13 RX ADMIN — FAMOTIDINE 20 MG: 10 INJECTION, SOLUTION INTRAVENOUS at 01:09

## 2024-09-13 RX ADMIN — SODIUM CHLORIDE, SODIUM LACTATE, POTASSIUM CHLORIDE, AND CALCIUM CHLORIDE: .6; .31; .03; .02 INJECTION, SOLUTION INTRAVENOUS at 12:09

## 2024-09-13 RX ADMIN — ONDANSETRON 4 MG: 2 INJECTION INTRAMUSCULAR; INTRAVENOUS at 01:09

## 2024-09-13 NOTE — PHARMACY MED REC
"Admission Medication History     The home medication history was taken by Ines Maguire.    You may go to "Admission" then "Reconcile Home Medications" tabs to review and/or act upon these items.     The home medication list has been updated by the Pharmacy department.   Please read ALL comments highlighted in yellow.   Please address this information as you see fit.    Feel free to contact us if you have any questions or require assistance.      The medications listed below were removed from the home medication list. Please reorder if appropriate:  Patient reports no longer taking the following medication(s):  Flonase Nasal Spray  Ibuprofen   Zofran    Medications listed below were obtained from: Patient/family and Analytic software- 2U  No current facility-administered medications on file prior to encounter.     Current Outpatient Medications on File Prior to Encounter   Medication Sig Dispense Refill    fluticasone propionate (FLONASE) 50 mcg/actuation nasal spray 1 spray (50 mcg total) by Each Nostril route 2 (two) times daily as needed for Rhinitis. 15 g 0    sertraline (ZOLOFT) 100 MG tablet TAKE 1 TABLET(100 MG) BY MOUTH EVERY DAY (Patient taking differently: Take 100 mg by mouth once daily.) 30 tablet 2    VRAYLAR 1.5 mg Cap TAKE 1 CAPSULE(1.5 MG) BY MOUTH EVERY DAY (Patient taking differently: Take 1 capsule by mouth once daily.) 30 capsule 5    [DISCONTINUED] cetirizine (ZYRTEC) 10 MG tablet Take 1 tablet (10 mg total) by mouth once daily. (Patient not taking: Reported on 1/10/2024) 30 tablet 0    [DISCONTINUED] famotidine (PEPCID) 20 MG tablet Take 1 tablet (20 mg total) by mouth 2 (two) times daily. for 14 days 28 tablet 0    [DISCONTINUED] ibuprofen (ADVIL,MOTRIN) 800 MG tablet Take 1 tablet (800 mg total) by mouth every 6 (six) hours as needed for Pain. 20 tablet 0    [DISCONTINUED] ondansetron (ZOFRAN-ODT) 4 MG TbDL Take 1 tablet (4 mg total) by mouth 2 (two) times daily. 20 tablet 0 "       Ines Maguire  EXT 1924                  .

## 2024-09-13 NOTE — ASSESSMENT & PLAN NOTE
Patient has  chronic  depression which is unknown and is currently controlled. Will Continue anti-depressant medications. We will not consult psychiatry at this time. Patient does not display psychosis at this time. Continue to monitor closely and adjust plan of care as needed.

## 2024-09-13 NOTE — OP NOTE
Former[facility], later [facility]  Surgery Department  Operative Note    SUMMARY     Date of Procedure: 9/13/2024     Procedure: Procedure(s) (LRB):  CHOLECYSTECTOMY, LAPAROSCOPIC (N/A)     Surgeons and Role:     * Benji Warner MD - Primary    Assisting Surgeon: None    Pre-Operative Diagnosis: Acute calculous cholecystitis [K80.00]    Post-Operative Diagnosis: Post-Op Diagnosis Codes:     * Acute calculous cholecystitis [K80.00]    Anesthesia: General    Operative Findings (including complications, if any):  Acute cholecystitis    Description of Technical Procedures:  This is a 43-year-old female who presented with gallstones, a dilated common duct on initial imaging.  She underwent MRCP which showed no filling defects and return to normal size of her bile duct.  I recommended cholecystectomy for likely chronic cholecystitis as well as possible choledocholithiasis.  She did consent to the planned procedure.      She was taken to the OR, placed supine, general endotracheal anesthesia was induced, the abdomen was prepped and draped in the usual fashion, a time-out was completed.  Access to the abdomen was achieved using open Don technique at the umbilicus.  The abdomen was insufflated to 15 mmHg a scope was inserted.  There was no apparent injury during entry.  Three 5 mm trocars were placed in the right upper quadrant under direct visualization.  The gallbladder was identified.  It was distended and mildly inflamed.  The dome was grasped and retracted cephalad.  Peritoneum was stripped off the base exposing the cystic duct and artery as they entered the gallbladder.  These were triply clipped and then divided.  The gallbladder was removed off the fossa using electrocautery, placed in an Endo-Catch bag, and removed from the navel.  The right upper quadrant was irrigated until all effluent was clear and hemostasis was confirmed.  5 mm trocars were then removed under direct visualization and insufflation was allowed to  escape.  Fascia at the navel was closed with an 0 Vicryl suture.  Skin was closed with 4-0 Monocryl.  Dermabond was applied as a dressing.  Patient tolerated the entire procedure without apparent complication, was extubated in the OR, brought to recovery in stable condition.  All counts were correct.    Significant Surgical Tasks Conducted by the Assistant(s), if Applicable: n/a    Estimated Blood Loss (EBL):min           Implants: * No implants in log *    Specimens:   Specimen (24h ago, onward)       Start     Ordered    09/13/24 1401  Specimen to Pathology - Surgery  Once        Comments: Pre-op Diagnosis: Acute calculous cholecystitis [K80.00]Procedure(s):CHOLECYSTECTOMY, LAPAROSCOPIC Number of specimens: 1Name of specimens: Gallbladder     Question:  Release to patient  Answer:  Immediate    09/13/24 1400                            Condition: Good    Disposition: PACU - hemodynamically stable.    Attestation: Op Note Attestation: I was physically present and scrubbed for the entire procedure.

## 2024-09-13 NOTE — NURSING
Nurses Note -- 4 Eyes      9/13/2024   7:38 AM      Skin assessed during: Admit      [x] No Altered Skin Integrity Present    []Prevention Measures Documented      [] Yes- Altered Skin Integrity Present or Discovered   [] LDA Added if Not in Epic (Describe Wound)   [] New Altered Skin Integrity was Present on Admit and Documented in LDA   [] Wound Image Taken    Wound Care Consulted? No    Attending Nurse:  Kristal Gil RN/Staff Member:  Ranjit

## 2024-09-13 NOTE — SUBJECTIVE & OBJECTIVE
Past Medical History:   Diagnosis Date    Anxiety     Depression        Past Surgical History:   Procedure Laterality Date    APPENDECTOMY       SECTION      EYE SURGERY      TUBAL LIGATION         Review of patient's allergies indicates:  No Known Allergies    No current facility-administered medications on file prior to encounter.     Current Outpatient Medications on File Prior to Encounter   Medication Sig    cetirizine (ZYRTEC) 10 MG tablet Take 1 tablet (10 mg total) by mouth once daily. (Patient not taking: Reported on 1/10/2024)    famotidine (PEPCID) 20 MG tablet Take 1 tablet (20 mg total) by mouth 2 (two) times daily. for 14 days    fluticasone propionate (FLONASE) 50 mcg/actuation nasal spray 1 spray (50 mcg total) by Each Nostril route 2 (two) times daily as needed for Rhinitis. (Patient not taking: Reported on 1/10/2024)    ibuprofen (ADVIL,MOTRIN) 800 MG tablet Take 1 tablet (800 mg total) by mouth every 6 (six) hours as needed for Pain.    ondansetron (ZOFRAN-ODT) 4 MG TbDL Take 1 tablet (4 mg total) by mouth 2 (two) times daily.    sertraline (ZOLOFT) 100 MG tablet TAKE 1 TABLET(100 MG) BY MOUTH EVERY DAY    VRAYLAR 1.5 mg Cap TAKE 1 CAPSULE(1.5 MG) BY MOUTH EVERY DAY     Family History       Problem Relation (Age of Onset)    Hypertension Father    No Known Problems Mother    Thyroid disease Father          Tobacco Use    Smoking status: Never     Passive exposure: Never    Smokeless tobacco: Never   Substance and Sexual Activity    Alcohol use: Yes     Comment: socially    Drug use: Never    Sexual activity: Yes     Partners: Male     Review of Systems   Gastrointestinal:  Positive for abdominal pain, nausea and vomiting.   All other systems reviewed and are negative.    Objective:     Vital Signs (Most Recent):  Temp: 97.6 °F (36.4 °C) (24)  Pulse: 78 (24)  Resp: 18 (24)  BP: (!) 149/95 (113) (24)  SpO2: 96 % (24) Vital Signs (24h  Range):  Temp:  [97.3 °F (36.3 °C)-97.6 °F (36.4 °C)] 97.6 °F (36.4 °C)  Pulse:  [75-86] 78  Resp:  [18-20] 18  SpO2:  [93 %-97 %] 96 %  BP: (131-210)/() 149/95     Weight: 113.4 kg (250 lb)  Body mass index is 41.6 kg/m².     Physical Exam  Constitutional:       General: She is not in acute distress.  HENT:      Head: Normocephalic.   Eyes:      Conjunctiva/sclera: Conjunctivae normal.      Pupils: Pupils are equal, round, and reactive to light.   Cardiovascular:      Rate and Rhythm: Normal rate and regular rhythm.      Pulses: Normal pulses.      Heart sounds: Normal heart sounds.   Pulmonary:      Effort: Pulmonary effort is normal. No respiratory distress.      Breath sounds: Normal breath sounds.      Comments: Diminished at the bases.  O2 via NC donned  Abdominal:      General: Bowel sounds are decreased.      Palpations: Abdomen is soft.      Tenderness: There is generalized abdominal tenderness (Postop pain).   Musculoskeletal:         General: Normal range of motion.      Cervical back: Normal range of motion and neck supple.   Skin:     General: Skin is warm and dry.      Capillary Refill: Capillary refill takes less than 2 seconds.      Comments: Lap sites   Neurological:      General: No focal deficit present.      Mental Status: She is alert and oriented to person, place, and time.              CRANIAL NERVES     CN III, IV, VI   Pupils are equal, round, and reactive to light.       Significant Labs: All pertinent labs within the past 24 hours have been reviewed.  CBC:   Recent Labs   Lab 09/13/24  0003   WBC 7.35   HGB 10.3*   HCT 35.1*        CMP:   Recent Labs   Lab 09/13/24  0003      K 4.1      CO2 20*   *   BUN 13   CREATININE 0.8   CALCIUM 9.5   PROT 7.9   ALBUMIN 3.9   BILITOT 0.3   ALKPHOS 91   AST 63*   ALT 88*   ANIONGAP 14     Lipase:   Recent Labs   Lab 09/13/24  0003   LIPASE 48     Urine Studies:   Recent Labs   Lab 09/12/24  2342   COLORU Yellow    APPEARANCEUA Hazy*   PHUR 6.0   SPECGRAV >1.030*   PROTEINUA 3+*   GLUCUA Negative   KETONESU Negative   BILIRUBINUA Negative   OCCULTUA 1+*   NITRITE Negative   UROBILINOGEN Negative   LEUKOCYTESUR Negative   RBCUA 3   WBCUA 2   BACTERIA None   SQUAMEPITHEL 3   HYALINECASTS 20*     Microbiology Results (last 7 days)       ** No results found for the last 168 hours. **            US Abdomen, Limited; Right Upper Quadrant, US Duplex Portal Vein, Limited Exam date and time: 9/13/2024 12:35 AM Age: 43 years old Clinical indication: Abdominal pain  IMPRESSION:   1. Findings suspicious for acute calculus cholecystitis.   2. A dilated common bile duct, possibility of choledocholithiasis/distal biliary obstruction cannot be excluded.   3. Recommend correlation with pertinent clinical history and follow-up as with MRCP and/or hepatobiliary scan if indicated. 4. Nonalcoholic fatty liver disease (NAFLD)/metabolic dysfunction-associated steatotic liver disease (MASLD) versus alcoholic hepatitis. Recommend correlation with liver function tests.   5. Patent portal vein and normal venous waveform on Doppler examination. 6. Normal RIGHT kidney without hydronephrosis.       Significant Imaging: I have reviewed all pertinent imaging results/findings within the past 24 hours.

## 2024-09-13 NOTE — ED PROVIDER NOTES
Encounter Date: 2024       History     Chief Complaint   Patient presents with    Abdominal Pain     ABD pain on right side and vomiting        Emergent evaluation of a 43-year-old female with history of gallstones, with biliary colic (seen by me in ed 3/28/24) , status post to C-sections, tubal ligation, appendectomy presents to the ER due to 3 hours of epigastric abdominal pain right upper quadrant abdominal pain nausea with vomiting. she reports that she ate tacos at a proximally 8:00 p.m. no chills sweats or fever.  No urinary symptoms no known sick contacts.  No possibility of food poisoning.  Patient reports since last being seen in the ER in March she was not had any recurrent biliary colic episodes.  And has been doing well.   Blood pressure elevated at triage 210/114        Review of patient's allergies indicates:  No Known Allergies  Past Medical History:   Diagnosis Date    Anxiety     Depression      Past Surgical History:   Procedure Laterality Date    APPENDECTOMY       SECTION      EYE SURGERY      TUBAL LIGATION       Family History   Problem Relation Name Age of Onset    No Known Problems Mother      Hypertension Father      Thyroid disease Father       Social History     Tobacco Use    Smoking status: Never     Passive exposure: Never    Smokeless tobacco: Never   Substance Use Topics    Alcohol use: Yes     Comment: socially    Drug use: Never     Review of Systems   Constitutional:  Negative for activity change, appetite change, chills, diaphoresis, fatigue and fever.   Respiratory:  Negative for cough, chest tightness and shortness of breath.    Cardiovascular:  Negative for chest pain.   Gastrointestinal:  Positive for abdominal pain, nausea and vomiting. Negative for abdominal distention, blood in stool, constipation and diarrhea.   Genitourinary:  Negative for dysuria, flank pain, frequency, hematuria and urgency.   Musculoskeletal:  Negative for arthralgias, back pain, myalgias,  neck pain and neck stiffness.   Skin:  Negative for rash.   Neurological:  Negative for dizziness, syncope, weakness, light-headedness and headaches.   Hematological:  Does not bruise/bleed easily.   Psychiatric/Behavioral:  Negative for confusion. The patient is not nervous/anxious.    All other systems reviewed and are negative.      Physical Exam     Initial Vitals [09/12/24 2152]   BP Pulse Resp Temp SpO2   (!) 210/114 86 18 97.3 °F (36.3 °C) 96 %      MAP       --         Physical Exam    Nursing note and vitals reviewed.  Constitutional: She appears well-developed and well-nourished. She is not diaphoretic. No distress.   HENT:   Head: Normocephalic and atraumatic.   Right Ear: External ear normal.   Left Ear: External ear normal.   Nose: Nose normal.   Mouth/Throat: Oropharynx is clear and moist.   Eyes: Conjunctivae and EOM are normal. Pupils are equal, round, and reactive to light.   Neck: Neck supple. No tracheal deviation present.   Normal range of motion.  Cardiovascular:  Normal rate, regular rhythm, normal heart sounds and intact distal pulses.     Exam reveals no gallop and no friction rub.       No murmur heard.  Repeat blood pressure 152/80 pulse 82   Pulmonary/Chest: Breath sounds normal. No stridor. No respiratory distress. She has no wheezes. She has no rhonchi. She has no rales. She exhibits no tenderness.   Abdominal: Abdomen is soft. Bowel sounds are normal. She exhibits no distension and no mass. There is abdominal tenderness.   Positive Salinas sign tenderness right upper quadrant no rebound or guarding no CVA tenderness There is no rebound and no guarding.   Musculoskeletal:         General: No edema. Normal range of motion.      Cervical back: Normal range of motion and neck supple.     Neurological: She is alert and oriented to person, place, and time. She has normal strength. No cranial nerve deficit or sensory deficit.   Skin: Skin is warm and dry. No rash noted. No erythema. No pallor.    Psychiatric: She has a normal mood and affect. Her behavior is normal. Judgment and thought content normal.         ED Course   Procedures  Labs Reviewed   CBC W/ AUTO DIFFERENTIAL - Abnormal       Result Value    WBC 7.35      RBC 4.79      Hemoglobin 10.3 (*)     Hematocrit 35.1 (*)     MCV 73 (*)     MCH 21.5 (*)     MCHC 29.3 (*)     RDW 19.4 (*)     Platelets 299      MPV 10.4      Immature Granulocytes 0.1      Gran # (ANC) 6.0      Immature Grans (Abs) 0.01      Lymph # 0.9 (*)     Mono # 0.4      Eos # 0.1      Baso # 0.02      nRBC 0      Gran % 81.9 (*)     Lymph % 11.7 (*)     Mono % 5.2      Eosinophil % 0.8      Basophil % 0.3      Differential Method Automated     COMPREHENSIVE METABOLIC PANEL - Abnormal    Sodium 141      Potassium 4.1      Chloride 107      CO2 20 (*)     Glucose 126 (*)     BUN 13      Creatinine 0.8      Calcium 9.5      Total Protein 7.9      Albumin 3.9      Total Bilirubin 0.3      Alkaline Phosphatase 91      AST 63 (*)     ALT 88 (*)     eGFR >60      Anion Gap 14     URINALYSIS, REFLEX TO URINE CULTURE - Abnormal    Specimen UA Urine, Clean Catch      Color, UA Yellow      Appearance, UA Hazy (*)     pH, UA 6.0      Specific Gravity, UA >1.030 (*)     Protein, UA 3+ (*)     Glucose, UA Negative      Ketones, UA Negative      Bilirubin (UA) Negative      Occult Blood UA 1+ (*)     Nitrite, UA Negative      Urobilinogen, UA Negative      Leukocytes, UA Negative      Narrative:     Specimen Source->Urine   URINALYSIS MICROSCOPIC - Abnormal    RBC, UA 3      WBC, UA 2      Bacteria None      Squam Epithel, UA 3      Hyaline Casts, UA 20 (*)     Ca Oxalate Irma, UA Occasional      Microscopic Comment SEE COMMENT      Narrative:     Specimen Source->Urine   LIPASE    Lipase 48     POCT URINE PREGNANCY    POC Preg Test, Ur Negative       Acceptable Yes            Imaging Results              US Abdomen Limited (In process)                      Medications    piperacillin-tazobactam (ZOSYN) 4.5 g in D5W 100 mL IVPB (MB+) (has no administration in time range)   sodium chloride 0.9% bolus 1,000 mL 1,000 mL (0 mLs Intravenous Stopped 9/13/24 0058)   morphine injection 4 mg (4 mg Intravenous Given 9/12/24 2353)   ondansetron injection 4 mg (4 mg Intravenous Given 9/12/24 2352)   metoclopramide injection 5 mg (5 mg Intravenous Given 9/13/24 0214)   morphine injection 4 mg (4 mg Intravenous Given 9/13/24 0221)     Medical Decision Making  Emergent evaluation of a 43-year-old female with history of gallstones, with biliary colic (seen by me in ed 3/28/24) , status post to C-sections, tubal ligation, appendectomy presents to the ER due to 3 hours of epigastric abdominal pain right upper quadrant abdominal pain nausea with vomiting. she reports that she ate tacos at a proximally 8:00 p.m. no chills sweats or fever.  No urinary symptoms no known sick contacts.  No possibility of food poisoning.  Patient reports since last being seen in the ER in March she was not had any recurrent biliary colic episodes.  And has been doing well.   Blood pressure elevated at triage 210/114  On exam patient was in no distress repeat blood pressure was 152/80 pulse 82 temperature 97.3° sats 95% on room air respirations 18  Soft abdomen tenderness to right upper quadrant with positive Salinas sign no rebound or guarding normal bowel sounds normal cardiac and lung exam no CVA tenderness no jaundice  MDM    Patient presents for emergent evaluation of acute 3 hours ruq abd pain that poses a threat to life and/or bodily function.   Differential diagnosis includes but was not limited to acute pancreatitis, acute cholecystitis and cholangitis, colitis, acute appendicitis, urinary tract infection, ovarian torsion, PID, TOA, pyelonephritis, kidney stone, volvulus, small bowel obstruction, enteritis, gastritis, colitis, mesenteric ischemia, AAA, AAA rupture, aortic dissection, constipation, upper or lower gi  bleeding, traumatic injury.     In the ED patient found to have acute biliary colic    I ordered labs and personally reviewed them.  Labs significant for see below  I ordered ultrasound and personally reviewed it and reviewed the radiologist interpretation.  Ultrasound right upper quadrant significant for PROCEDURE INFORMATION: Exam: US Abdomen, Limited; Right Upper Quadrant, US Duplex Portal Vein, Limited Exam date and time: 9/13/2024 12:35 AM Age: 43 years old Clinical indication: Abdominal pain TECHNIQUE: Imaging protocol: Real time ultrasound of the abdomen with image documentation. Limited exam focused on the right upper quadrant. Real-time duplex limited ultrasound scan of the portal vein with color Doppler flow and spectral waveform analysis with image documentation. Duplex images were received to evaluate vascular conditions. COMPARISON: US ABDOMEN LIMITED 3/28/2024 8:51 PM FINDINGS: Liver: Hepatomegaly with diffuse fatty infiltration of the liver. No significant intrahepatic biliary ductal dilation. Gallbladder: Distended gallbladder with dependent echogenic sludge and gallstones. Increased wall thickness without pericholecystic fluid. Sonographic Salinas's sign is positive. Biliary ducts: The common duct measures approximately 15 mm. Pancreas: Pancreas is mostly obscured by bowel gas. Visualized pancreas is unremarkable. Right kidney: Measures 10.1 cm and demonstrates normal echotexture, normal renal cortical thickness without nephrolithiasis or hydronephrosis. Portal venous: Doppler examination of the portal vein with pulsed wave/color images was performed which demonstrates patent portal vein, hepatopedal flow and normal portal venous waveform. Other findings: No evidence of ascites. IMPRESSION: 1. Findings suspicious for acute calculus cholecystitis. 2. A dilated common bile duct, possibility of choledocholithiasis/distal biliary obstruction cannot be excluded. 3. Recommend correlation with pertinent  clinical history and follow-up as with MRCP and/or hepatobiliary scan if indicated. 4. Nonalcoholic fatty liver disease (NAFLD)/metabolic dysfunction-associated steatotic liver disease (MASLD) versus alcoholic hepatitis. Recommend correlation with liver function tests. 5. Patent portal vein and normal venous waveform on Doppler examination. 6. Normal RIGHT kidney without hydronephrosis. SHEFALI KELLER Accession: [id number] MRN: [id number]  Preliminary Radiology Report  (QA) DISCREPANCY? If there is a discrepancy between the preliminary and final interpretation, please notify [facility] via [email or web address]. If you do not have access to our QA portal, call our QA team at [phone] CONFIDENTIALITY STATEMENT This report is intended only for the use of the referring physician, and only in accordance with law, If you received this in error, call [phone] Page 2 of 2 Dictated and Authenticated by: Fazal Hamilton MD 09/13/2024 2:21 AM Central Time ([place] & [place])      Admit MDM   I have discussed the patient's presentation history labs ultrasound with Hospital Medicine and General surgery  Patient was managed in the ED with IV morphine 4 mg, Zofran 4 mg 1 L normal saline.   The response to treatment was good.      Abi Dubois MD    With ultrasound showing signs of acute calculous cholecystitis as well as possible choledocholithiasis patient was be given IV antibiotics, zosyn,  and I will discuss with General surgery for admission.  I spoke with  who requested I ensure patient can get MRCP at this facility and discuss with GI if patient can not be cared for at [facility].  I spoke with Dr. Pineda, on call GI, who reports patient needs to be transferred to [facility] due to needing for ERCP due to degree of dilation on CBD.   Will discus transfer with their Hospitalists.   Abi Dubois M.D.  3:08 AM 9/13/2024    A dictation software program was used for this note. Please expect some simple  typographical errors in this note.     Pt accepted by Dr Alberto.  Abi Dubois M.D.  3:19 AM 9/13/2024        Amount and/or Complexity of Data Reviewed  External Data Reviewed: notes.  Labs: ordered.  Radiology: ordered.    Risk  Prescription drug management.  Decision regarding hospitalization.               ED Course as of 09/13/24 0308   Fri Sep 13, 2024   0036 CMP WITH BICARB 20 GLUCOSE 126 AST 63 ALT 88  LIPASE 48  UPT NEGATIVE  CBC with a white count 7.35 H&H 10.3 and 35.1 platelets [id number]  Urine is hazy increased specific gravity 3+ protein 1+ blood 20 hyaline casts no bacteria 2 white blood cells 3 red blood cells 3 squamous cells [RM]   0214 Abdominal pain was beginning to recur will give 4 mg of morphine 5 mg of Reglan awaiting ultrasound results at this time [RM]      ED Course User Index  [RM] Abi Dubois MD                           Clinical Impression:  Final diagnoses:  [K80.00] Acute calculous cholecystitis (Primary)  [K80.50] Choledocholithiasis  [R74.01] Transaminitis  [R10.11] Right upper quadrant abdominal pain  [R11.2] Nausea and vomiting, unspecified vomiting type          ED Disposition Condition    Admit Stable                Abi Dubois MD  09/13/24 0308       Abi Dubois MD  09/13/24 0317

## 2024-09-13 NOTE — CONSULTS
[facility]  General Surgery  Consult Note    Inpatient consult to General Surgery  Consult performed by: Benji Warner MD  Consult ordered by: Abi Dubois MD        Subjective:     Chief Complaint/Reason for Admission:  Possible cholecystitis/choledocholithiasis    History of Present Illness:  This is a 43-year-old female with gallstones.  She presented with right upper quadrant abdominal pain.  Initial imaging showed gallstones without wall thickening or pericholecystic fluid but she did have a dilated common bile duct.  She underwent MRCP which showed now normal size of her bile duct.  Given stones and questionable choledocholithiasis, recommended proceeding to the OR for cholecystectomy.  Patient reports prior appendectomy.    No current facility-administered medications on file prior to encounter.     Current Outpatient Medications on File Prior to Encounter   Medication Sig    fluticasone propionate (FLONASE) 50 mcg/actuation nasal spray 1 spray (50 mcg total) by Each Nostril route 2 (two) times daily as needed for Rhinitis.    sertraline (ZOLOFT) 100 MG tablet TAKE 1 TABLET(100 MG) BY MOUTH EVERY DAY (Patient taking differently: Take 100 mg by mouth once daily.)    VRAYLAR 1.5 mg Cap TAKE 1 CAPSULE(1.5 MG) BY MOUTH EVERY DAY (Patient taking differently: Take 1 capsule by mouth once daily.)    [DISCONTINUED] cetirizine (ZYRTEC) 10 MG tablet Take 1 tablet (10 mg total) by mouth once daily. (Patient not taking: Reported on 1/10/2024)    [DISCONTINUED] famotidine (PEPCID) 20 MG tablet Take 1 tablet (20 mg total) by mouth 2 (two) times daily. for 14 days    [DISCONTINUED] ibuprofen (ADVIL,MOTRIN) 800 MG tablet Take 1 tablet (800 mg total) by mouth every 6 (six) hours as needed for Pain.    [DISCONTINUED] ondansetron (ZOFRAN-ODT) 4 MG TbDL Take 1 tablet (4 mg total) by mouth 2 (two) times daily.       Review of patient's allergies indicates:  No Known Allergies    Past Medical History:    Diagnosis Date    Anxiety     Depression      Past Surgical History:   Procedure Laterality Date    APPENDECTOMY       SECTION      EYE SURGERY      TUBAL LIGATION       Family History       Problem Relation (Age of Onset)    Hypertension Father    No Known Problems Mother    Thyroid disease Father          Tobacco Use    Smoking status: Never     Passive exposure: Never    Smokeless tobacco: Never   Substance and Sexual Activity    Alcohol use: Yes     Comment: socially    Drug use: Never    Sexual activity: Yes     Partners: Male     Review of Systems   Constitutional:  Negative for fever.   HENT: Negative.     Eyes: Negative.    Respiratory: Negative.     Cardiovascular: Negative.    Gastrointestinal:  Positive for abdominal pain.   Endocrine: Negative.    Genitourinary: Negative.    Musculoskeletal: Negative.    Skin: Negative.    Allergic/Immunologic: Negative.    Neurological: Negative.    Hematological: Negative.    Psychiatric/Behavioral: Negative.       Objective:     Vital Signs (Most Recent):  Temp: 98 °F (36.7 °C) (24 112)  Pulse: 71 (24 112)  Resp: 18 (24 112)  BP: (!) 154/90 (111) (24 112)  SpO2: 96 % (24) Vital Signs (24h Range):  Temp:  [97.3 °F (36.3 °C)-98 °F (36.7 °C)] 98 °F (36.7 °C)  Pulse:  [71-86] 71  Resp:  [18-20] 18  SpO2:  [93 %-97 %] 96 %  BP: (131-210)/() 154/90     Weight: 113.4 kg (250 lb)  Body mass index is 40.35 kg/m².      Intake/Output Summary (Last 24 hours) at 2024 1437  Last data filed at 2024 1412  Gross per 24 hour   Intake 2349 ml   Output --   Net 2349 ml       Physical Exam  Constitutional:       General: She is not in acute distress.     Appearance: Normal appearance. She is obese. She is not ill-appearing, toxic-appearing or diaphoretic.   HENT:      Head: Normocephalic.      Nose: Nose normal.   Eyes:      Conjunctiva/sclera: Conjunctivae normal.   Cardiovascular:      Rate and Rhythm: Normal rate and  "regular rhythm.   Pulmonary:      Effort: Pulmonary effort is normal.   Abdominal:      Palpations: Abdomen is soft.   Musculoskeletal:         General: Normal range of motion.      Cervical back: Normal range of motion.   Skin:     General: Skin is warm.   Neurological:      General: No focal deficit present.      Mental Status: She is alert.   Psychiatric:         Mood and Affect: Mood normal.         Significant Labs:  CBC:   Recent Labs   Lab 09/13/24  0003   WBC 7.35   RBC 4.79   HGB 10.3*   HCT 35.1*      MCV 73*   MCH 21.5*   MCHC 29.3*     CMP:   Recent Labs   Lab 09/13/24  0003   *   CALCIUM 9.5   ALBUMIN 3.9   PROT 7.9      K 4.1   CO2 20*      BUN 13   CREATININE 0.8   ALKPHOS 91   ALT 88*   AST 63*   BILITOT 0.3     Coagulation: No results for input(s): "PT", "INR", "APTT" in the last 48 hours.  Lactic Acid: No results for input(s): "LACTATE" in the last 48 hours.    Significant Diagnostics:  Ultrasound was reviewed.  Gallstones without secondary findings of cholecystitis.  The bile duct is 15 mm   MRCP was reviewed.  Normal biliary tree without filling defects.    Assessment/Plan:     Active Diagnoses:    Diagnosis Date Noted POA    PRINCIPAL PROBLEM:  Choledocholithiasis with acute cholecystitis [K80.42] 09/13/2024 Yes    Depression [F32.A] 09/13/2024 Yes      Problems Resolved During this Admission:       43-year-old female with possible choledocholithiasis.  I suspect a stone may have passed which would explain mild elevation in LFTs as well as initial findings of dilation of her bile duct.  Given what sounds like symptomatic cholelithiasis, recommended proceeding to the OR for cholecystectomy.  Patient did consent to the planned procedure.    Thank you for your consult. I will follow-up with patient. Please contact us if you have any additional questions.    Benji Warner MD  General Surgery  [facility]  "

## 2024-09-13 NOTE — ANESTHESIA PROCEDURE NOTES
Intubation    Date/Time: 9/13/2024 12:56 PM    Performed by: Josiah Correia CRNA  Authorized by: Mc Morales MD    Intubation:     Induction:  Intravenous    Intubated:  Postinduction    Mask Ventilation:  Not attempted    Attempts:  1    Attempted By:  CRNA    Method of Intubation:  Fiberoptic    Blade:  Smith 3    Laryngeal View Grade: Grade I - full view of cords      Difficult Airway Encountered?: No      Complications:  None    Airway Device:  Oral endotracheal tube    Airway Device Size:  7.5    Style/Cuff Inflation:  Cuffed (inflated to minimal occlusive pressure)    Inflation Amount (mL):  8    Tube secured:  21    Secured at:  The lips    Placement Verified By:  Capnometry and Fiber optic visualization    Complicating Factors:  None    Findings Post-Intubation:  BS equal bilateral and atraumatic/condition of teeth unchanged

## 2024-09-13 NOTE — ASSESSMENT & PLAN NOTE
MRCP: Cholelithiasis. Common bile duct is normal caliber.  General surgery consulted: S/P lap Callie  Continue pain management, IV antiemetics PRN  Advance diet as tolerated  Labs in a.m.

## 2024-09-13 NOTE — HPI
Ms. Hoffman is a 43-year-old female with a past medical history of depression and cholelithiasis.  She presented to [facility] ED with complaints of acute abdominal pain.  Started 3 hours PTA and associated with nausea and vomiting.  While in the ED, she had an abdominal ultrasound which showed signs of acute calculous cholecystitis and possible choledocholithiasis.  She was initiated on IV Zosyn, IV pain medication, and IV antiemetics.  Case was discussed both with General surgery and GI, who recommended transfer to [facility] for MRCP vs. ERCP. Shortly after being transferred to [facility], she underwent an MRCP which showed cholelithiasis and a normal CBD.  She was then taken to surgery by Dr. Warner and had a lap cholecystectomy.  She tolerated the procedure well.  She currently reports her pain controlled and denies nausea.  She will be started on a clear liquid diet and advanced as tolerated.  LFTs will be rechecked in the morning.

## 2024-09-13 NOTE — CARE UPDATE
09/13/24 1800   Patient Assessment/Suction   Level of Consciousness (AVPU) alert   Respiratory Effort Unlabored;Normal   Rhythm/Pattern, Respiratory no shortness of breath reported   PRE-TX-O2   Device (Oxygen Therapy) nasal cannula   $ Is the patient on Low Flow Oxygen? Yes   Flow (L/min) (Oxygen Therapy) 2   Pulse Oximetry Type Intermittent   $ Pulse Oximetry - Multiple Charge Pulse Oximetry - Multiple   Pulse 80   Resp 16

## 2024-09-13 NOTE — PLAN OF CARE
Critical access hospital  Initial Discharge Assessment       Primary Care Provider: Laura Abdalla FNP-C    Admission Diagnosis: Acute calculous cholecystitis [K80.00]    Admission Date: 9/12/2024  Expected Discharge Date: 9/14/2024     met with Pt at bedside to complete discharge assessment. Pt AAOx4s. Demographics, PCP, and insurance verified. No home health. No dialysis. Pt reports ability to complete ADLs without assistance. Pt verbalized plan to discharge home via family transport. Pt has no other needs to be addressed at this time.    Transition of Care Barriers: None    Payor: MEDICAID / Plan: [place] [employer] / Product Type: Managed Medicaid /     Extended Emergency Contact Information  Primary Emergency Contact: JESSICA ROGERS  Mobile Phone: [phone]  Relation: Mother   needed? No    Discharge Plan A: Home with family  Discharge Plan B: Home with family      "[id number] - [place] - [address] & [facility]  [address]  [facility] [place] [address]  Phone: [phone] Fax: [phone]      Initial Assessment (most recent)       Adult Discharge Assessment - 09/13/24 1158          Discharge Assessment    Assessment Type Discharge Planning Assessment     Confirmed/corrected address, phone number and insurance Yes     Confirmed Demographics Correct on Facesheet     Source of Information patient     Communicated KATHY with patient/caregiver Date not available/Unable to determine     Reason For Admission Choledocholithiasis with acute cholecystitis     People in Home child(celi), dependent     Do you expect to return to your current living situation? Yes     Do you have help at home or someone to help you manage your care at home? Yes     Who are your caregiver(s) and their phone number(s)? JESSICA ROGERS (Mother)  [phone]     Prior to hospitilization cognitive status: Alert/Oriented     Current cognitive status: Alert/Oriented      Walking or Climbing Stairs Difficulty no     Dressing/Bathing Difficulty no     Home Accessibility wheelchair accessible     Home Layout Able to live on 1st floor     Equipment Currently Used at Home none     Readmission within 30 days? No     Patient currently being followed by outpatient case management? No     Do you currently have service(s) that help you manage your care at home? No     Do you take prescription medications? Yes     Do you have prescription coverage? Yes     Coverage Payor: MEDICAID - [facility] -     Do you have any problems affording any of your prescribed medications? No     Is the patient taking medications as prescribed? yes     Who is going to help you get home at discharge? JESSICA ROGERS (Mother)  [phone]     How do you get to doctors appointments? car, drives self     Are you on dialysis? No     Do you take coumadin? No     Discharge Plan A Home with family     Discharge Plan B Home with family     Discharge Plan discussed with: Patient     Transition of Care Barriers None        Physical Activity    On average, how many days per week do you engage in moderate to strenuous exercise (like a brisk walk)? 0 days     On average, how many minutes do you engage in exercise at this level? 0 min        Financial Resource Strain    How hard is it for you to pay for the very basics like food, housing, medical care, and heating? Not hard at all        Housing Stability    In the last 12 months, was there a time when you were not able to pay the mortgage or rent on time? No     At any time in the past 12 months, were you homeless or living in a shelter (including now)? No        Transportation Needs    Has the lack of transportation kept you from medical appointments, meetings, work or from getting things needed for daily living? No        Food Insecurity    Within the past 12 months, you worried that your food would run out before you got the money to buy more. Never true     Within  the past 12 months, the food you bought just didn't last and you didn't have money to get more. Never true        Social Isolation    How often do you feel lonely or isolated from those around you?  Never        Alcohol Use    Q1: How often do you have a drink containing alcohol? Monthly or less     Q2: How many drinks containing alcohol do you have on a typical day when you are drinking? 1 or 2     Q3: How often do you have six or more drinks on one occasion? Never        Utilities    In the past 12 months has the electric, gas, oil, or water company threatened to shut off services in your home? No        Health Literacy    How often do you need to have someone help you when you read instructions, pamphlets, or other written material from your doctor or pharmacy? Never

## 2024-09-13 NOTE — NURSING
Pt return to her room. Transfer went well. $ puncture site to abd CDI. Pt up to bathroom with assist. Pt denies any pain or discomfort at this time. Bed in low positions lock and call light in reach.

## 2024-09-13 NOTE — PLAN OF CARE
Patient underwent cholecystectomy.  She may start on clear liquids and advance as tolerated.  Repeat LFTs in the morning.  If these are stable/normalizing, okay for DC on regular diet tomorrow.

## 2024-09-13 NOTE — TRANSFER OF CARE
"Anesthesia Transfer of Care Note    Patient: Raina Hoffman    Procedure(s) Performed: Procedure(s) (LRB):  CHOLECYSTECTOMY, LAPAROSCOPIC (N/A)    Patient location: PACU    Anesthesia Type: general    Transport from OR: Transported from OR on room air with adequate spontaneous ventilation    Post pain: adequate analgesia    Post assessment: no apparent anesthetic complications and tolerated procedure well    Post vital signs: stable    Level of consciousness: awake and alert    Nausea/Vomiting: no nausea/vomiting    Complications: none    Transfer of care protocol was followed      Last vitals: Visit Vitals  BP (!) 154/90 (BP Location: Right forearm, Patient Position: Sitting)   Pulse 71   Temp 36.7 °C (98 °F) (Oral)   Resp 18   Ht 5' 6" (1.676 m)   Wt 113.4 kg (250 lb)   SpO2 96%   Breastfeeding No   BMI 40.35 kg/m²     "

## 2024-09-13 NOTE — ANESTHESIA POSTPROCEDURE EVALUATION
Anesthesia Post Evaluation    Patient: Raina Hoffman    Procedure(s) Performed: Procedure(s) (LRB):  CHOLECYSTECTOMY, LAPAROSCOPIC (N/A)    Final Anesthesia Type: general      Patient location during evaluation: PACU  Patient participation: Yes- Able to Participate  Level of consciousness: awake and alert  Post-procedure vital signs: reviewed and stable  Pain management: adequate  Airway patency: patent    PONV status at discharge: No PONV  Anesthetic complications: no      Cardiovascular status: blood pressure returned to baseline and stable  Respiratory status: unassisted and nasal cannula  Hydration status: euvolemic  Follow-up not needed.  Comments: Patient kept on oxygen since oxygen would drop to low 90's.  IS encouraged.  Patient otherwise okay.  I did tell her to get sleep study once she recovers from surgery.              Vitals Value Taken Time   /77 09/13/24 1530   Temp 36.9 °C (98.4 °F) 09/13/24 1425   Pulse 77 09/13/24 1540   Resp 19 09/13/24 1540   SpO2 95 % 09/13/24 1540   Vitals shown include unfiled device data.      Event Time   Out of Recovery 09/13/2024 15:42:37         Pain/Raysa Score: Pain Rating Prior to Med Admin: 6 (9/13/2024  3:00 PM)  Pain Rating Post Med Admin: 4 (9/13/2024  9:00 AM)  Raysa Score: 10 (9/13/2024  3:30 PM)

## 2024-09-13 NOTE — H&P
[facility] Medicine  History & Physical    Patient Name: Raina Hoffman  MRN: [id number]  Patient Class: IP- Inpatient  Admission Date: 2024  Attending Physician: Riddhi Araya MD   Primary Care Provider: Laura Abdalla FNP-C         Patient information was obtained from ER records.     Subjective:     Principal Problem:Choledocholithiasis with acute cholecystitis    Chief Complaint:   Chief Complaint   Patient presents with    Abdominal Pain     ABD pain on right side and vomiting        HPI: Ms. Hoffman is a 43-year-old female with a past medical history of depression and cholelithiasis.  She presented to [facility] ED with complaints of acute abdominal pain.  Started 3 hours PTA and associated with nausea and vomiting.  While in the ED, she had an abdominal ultrasound which showed signs of acute calculous cholecystitis and possible choledocholithiasis.  She was initiated on IV Zosyn, IV pain medication, and IV antiemetics.  Case was discussed both with General surgery and GI, who recommended transfer to [facility] for MRCP vs. ERCP. Shortly after being transferred to [facility], she underwent an MRCP which showed cholelithiasis and a normal CBD.  She was then taken to surgery by Dr. Warner and had a lap cholecystectomy.  She tolerated the procedure well.  She currently reports her pain controlled and denies nausea.  She will be started on a clear liquid diet and advanced as tolerated.  LFTs will be rechecked in the morning.    Past Medical History:   Diagnosis Date    Anxiety     Depression        Past Surgical History:   Procedure Laterality Date    APPENDECTOMY       SECTION      EYE SURGERY      TUBAL LIGATION         Review of patient's allergies indicates:  No Known Allergies    No current facility-administered medications on file prior to encounter.     Current Outpatient Medications on File Prior to Encounter   Medication Sig    cetirizine (ZYRTEC) 10 MG  tablet Take 1 tablet (10 mg total) by mouth once daily. (Patient not taking: Reported on 1/10/2024)    famotidine (PEPCID) 20 MG tablet Take 1 tablet (20 mg total) by mouth 2 (two) times daily. for 14 days    fluticasone propionate (FLONASE) 50 mcg/actuation nasal spray 1 spray (50 mcg total) by Each Nostril route 2 (two) times daily as needed for Rhinitis. (Patient not taking: Reported on 1/10/2024)    ibuprofen (ADVIL,MOTRIN) 800 MG tablet Take 1 tablet (800 mg total) by mouth every 6 (six) hours as needed for Pain.    ondansetron (ZOFRAN-ODT) 4 MG TbDL Take 1 tablet (4 mg total) by mouth 2 (two) times daily.    sertraline (ZOLOFT) 100 MG tablet TAKE 1 TABLET(100 MG) BY MOUTH EVERY DAY    VRAYLAR 1.5 mg Cap TAKE 1 CAPSULE(1.5 MG) BY MOUTH EVERY DAY     Family History       Problem Relation (Age of Onset)    Hypertension Father    No Known Problems Mother    Thyroid disease Father          Tobacco Use    Smoking status: Never     Passive exposure: Never    Smokeless tobacco: Never   Substance and Sexual Activity    Alcohol use: Yes     Comment: socially    Drug use: Never    Sexual activity: Yes     Partners: Male     Review of Systems   Gastrointestinal:  Positive for abdominal pain, nausea and vomiting.   All other systems reviewed and are negative.    Objective:     Vital Signs (Most Recent):  Temp: 97.6 °F (36.4 °C) (09/13/24 0734)  Pulse: 78 (09/13/24 0734)  Resp: 18 (09/13/24 0734)  BP: (!) 149/95 (113) (09/13/24 0735)  SpO2: 96 % (09/13/24 0734) Vital Signs (24h Range):  Temp:  [97.3 °F (36.3 °C)-97.6 °F (36.4 °C)] 97.6 °F (36.4 °C)  Pulse:  [75-86] 78  Resp:  [18-20] 18  SpO2:  [93 %-97 %] 96 %  BP: (131-210)/() 149/95     Weight: 113.4 kg (250 lb)  Body mass index is 41.6 kg/m².     Physical Exam  Constitutional:       General: She is not in acute distress.  HENT:      Head: Normocephalic.   Eyes:      Conjunctiva/sclera: Conjunctivae normal.      Pupils: Pupils are equal, round, and reactive to  light.   Cardiovascular:      Rate and Rhythm: Normal rate and regular rhythm.      Pulses: Normal pulses.      Heart sounds: Normal heart sounds.   Pulmonary:      Effort: Pulmonary effort is normal. No respiratory distress.      Breath sounds: Normal breath sounds.      Comments: Diminished at the bases.  O2 via NC donned  Abdominal:      General: Bowel sounds are decreased.      Palpations: Abdomen is soft.      Tenderness: There is generalized abdominal tenderness (Postop pain).   Musculoskeletal:         General: Normal range of motion.      Cervical back: Normal range of motion and neck supple.   Skin:     General: Skin is warm and dry.      Capillary Refill: Capillary refill takes less than 2 seconds.      Comments: Lap sites   Neurological:      General: No focal deficit present.      Mental Status: She is alert and oriented to person, place, and time.              CRANIAL NERVES     CN III, IV, VI   Pupils are equal, round, and reactive to light.       Significant Labs: All pertinent labs within the past 24 hours have been reviewed.  CBC:   Recent Labs   Lab 09/13/24  0003   WBC 7.35   HGB 10.3*   HCT 35.1*        CMP:   Recent Labs   Lab 09/13/24  0003      K 4.1      CO2 20*   *   BUN 13   CREATININE 0.8   CALCIUM 9.5   PROT 7.9   ALBUMIN 3.9   BILITOT 0.3   ALKPHOS 91   AST 63*   ALT 88*   ANIONGAP 14     Lipase:   Recent Labs   Lab 09/13/24  0003   LIPASE 48     Urine Studies:   Recent Labs   Lab 09/12/24  2342   COLORU Yellow   APPEARANCEUA Hazy*   PHUR 6.0   SPECGRAV >1.030*   PROTEINUA 3+*   GLUCUA Negative   KETONESU Negative   BILIRUBINUA Negative   OCCULTUA 1+*   NITRITE Negative   UROBILINOGEN Negative   LEUKOCYTESUR Negative   RBCUA 3   WBCUA 2   BACTERIA None   SQUAMEPITHEL 3   HYALINECASTS 20*     Microbiology Results (last 7 days)       ** No results found for the last 168 hours. **            US Abdomen, Limited; Right Upper Quadrant, US Duplex Portal Vein, Limited  Exam date and time: 9/13/2024 12:35 AM Age: 43 years old Clinical indication: Abdominal pain  IMPRESSION:   1. Findings suspicious for acute calculus cholecystitis.   2. A dilated common bile duct, possibility of choledocholithiasis/distal biliary obstruction cannot be excluded.   3. Recommend correlation with pertinent clinical history and follow-up as with MRCP and/or hepatobiliary scan if indicated. 4. Nonalcoholic fatty liver disease (NAFLD)/metabolic dysfunction-associated steatotic liver disease (MASLD) versus alcoholic hepatitis. Recommend correlation with liver function tests.   5. Patent portal vein and normal venous waveform on Doppler examination. 6. Normal RIGHT kidney without hydronephrosis.       Significant Imaging: I have reviewed all pertinent imaging results/findings within the past 24 hours.  Assessment/Plan:     * Choledocholithiasis with acute cholecystitis  MRCP: Cholelithiasis. Common bile duct is normal caliber.  General surgery consulted: S/P lap Callie  Continue pain management, IV antiemetics PRN  Advance diet as tolerated  Labs in a.m.    Depression  Patient has  chronic  depression which is unknown and is currently controlled. Will Continue anti-depressant medications. We will not consult psychiatry at this time. Patient does not display psychosis at this time. Continue to monitor closely and adjust plan of care as needed.      VTE Risk Mitigation (From admission, onward)           Ordered     Reason for No Pharmacological VTE Prophylaxis  Once        Comments: surgical procedure   Question:  Reasons:  Answer:  Risk of Bleeding    09/13/24 0751     IP VTE HIGH RISK PATIENT  Once         09/13/24 0751     Place sequential compression device  Until discontinued         09/13/24 0751                            Vivian Daugherty NP  Department of Hospital Medicine  [facility]

## 2024-09-13 NOTE — NURSING
Pt arrive via stretcher, transfer went well. [place]  PT to room PT stated understood. Bed in low position wheels lock call light within reach.

## 2024-09-13 NOTE — ANESTHESIA PREPROCEDURE EVALUATION
2024  Raina Hoffman is a 43 y.o., female.    Patient Active Problem List   Diagnosis    Choledocholithiasis with acute cholecystitis    Depression       Past Surgical History:   Procedure Laterality Date    APPENDECTOMY       SECTION      EYE SURGERY      TUBAL LIGATION          Tobacco Use:  The patient  reports that she has never smoked. She has never been exposed to tobacco smoke. She has never used smokeless tobacco.     No results found for this or any previous visit.     Imaging Results              US Abdomen Limited (Final result)  Result time 24 08:09:15      Final result by Rosales Huang Jr., MD (24 08:09:15)                   Impression:      Cholelithiasis.  Dilated common bile duct without a point of obstruction identified.  Fatty infiltration of the liver parenchyma.      Electronically signed by: Rosales Huang MD  Date:    2024  Time:    08:09               Narrative:    EXAMINATION:  US ABDOMEN LIMITED    CLINICAL HISTORY:  Abdominal Pain - Gallbladder;    TECHNIQUE:  Limited ultrasound of the right upper quadrant of the abdomen (including pancreas, liver, gallbladder, common bile duct, and spleen) was performed.    COMPARISON:  None.    FINDINGS:  Liver: Normal in size, measuring 18.6 cm. There is diffuse increased echogenicity of the liver parenchyma suggesting fatty infiltration without  focal hepatic lesions.    Gallbladder: 2 gallstones are noted.  Edema or thickening of the gallbladder wall is not seen measuring 3 mm.    Biliary system: The common duct is dilated, measuring 15 mm.  A stone within the common bile duct is not dilated on this modality.  No intrahepatic ductal dilatation.    The right kidney measures 10.1 cm without mass or hydronephrosis.    Miscellaneous: No upper abdominal ascites.                                       Lab  Results   Component Value Date    WBC 7.35 09/13/2024    HGB 10.3 (L) 09/13/2024    HCT 35.1 (L) 09/13/2024    MCV 73 (L) 09/13/2024     09/13/2024     BMP  Lab Results   Component Value Date     09/13/2024    K 4.1 09/13/2024     09/13/2024    CO2 20 (L) 09/13/2024    BUN 13 09/13/2024    CREATININE 0.8 09/13/2024    CALCIUM 9.5 09/13/2024    ANIONGAP 14 09/13/2024     (H) 09/13/2024     (H) 03/28/2024    GLU 94 11/15/2022       No results found for this or any previous visit.            Pre-op Assessment    I have reviewed the Patient Summary Reports.     I have reviewed the Nursing Notes. I have reviewed the NPO Status.   I have reviewed the Medications.     Review of Systems  Anesthesia Hx:  No problems with previous Anesthesia             Denies Family Hx of Anesthesia complications.    Denies Personal Hx of Anesthesia complications.                    Social:  Non-Smoker       Hematology/Oncology:       -- Anemia:                                  EENT/Dental:  EENT/Dental Normal           Cardiovascular:  Cardiovascular Normal                                            Pulmonary:         Possible sleep apnea.               Renal/:  Renal/ Normal                 Hepatic/GI:  Hepatic/GI Normal       Acute cholecystitis.  Choledocholithiasis.          Musculoskeletal:  Musculoskeletal Normal                Neurological:  Neurology Normal                                      Endocrine:  Endocrine Normal          Morbid Obesity / BMI > 40  Psych:    depression                Physical Exam  General: Well nourished    Airway:  Mallampati: II   Mouth Opening: Normal  TM Distance: Normal  Tongue: Normal  Neck ROM: Normal ROM    Chest/Lungs:  Clear to auscultation, Normal Respiratory Rate    Heart:  Rate: Normal  Rhythm: Regular Rhythm        Anesthesia Plan  Type of Anesthesia, risks & benefits discussed:    Anesthesia Type: Gen ETT  Intra-op Monitoring Plan: Standard ASA  Monitors  Post Op Pain Control Plan: IV/PO Opioids PRN and multimodal analgesia  Induction:  IV and rapid sequence  Airway Plan: Video  Informed Consent: Informed consent signed with the Patient and all parties understand the risks and agree with anesthesia plan.  All questions answered.   ASA Score: 3  Anesthesia Plan Notes: GETA.  RSI  No tylenol.  Sleep apnea precautions, give reduce fentanyl and versed dose, awake extubation, and sitting up positioning.  Multimodal analgesia with ketamine 25 mg and local by surgeon..  PONV prophylaxis with Pepcid 20 mg IV, and Zofran 4 mg IV.        Ready For Surgery From Anesthesia Perspective.     .

## 2024-09-14 VITALS
OXYGEN SATURATION: 94 % | WEIGHT: 250 LBS | RESPIRATION RATE: 14 BRPM | HEART RATE: 77 BPM | HEIGHT: 66 IN | SYSTOLIC BLOOD PRESSURE: 129 MMHG | BODY MASS INDEX: 40.18 KG/M2 | TEMPERATURE: 98 F | DIASTOLIC BLOOD PRESSURE: 81 MMHG

## 2024-09-14 DIAGNOSIS — G89.18 POST-OP PAIN: Primary | ICD-10-CM

## 2024-09-14 LAB
ALBUMIN SERPL BCP-MCNC: 3.7 G/DL (ref 3.5–5.2)
ALP SERPL-CCNC: 66 U/L (ref 55–135)
ALT SERPL W/O P-5'-P-CCNC: 83 U/L (ref 10–44)
ANION GAP SERPL CALC-SCNC: 5 MMOL/L (ref 8–16)
AST SERPL-CCNC: 56 U/L (ref 10–40)
BASOPHILS # BLD AUTO: 0.03 K/UL (ref 0–0.2)
BASOPHILS NFR BLD: 0.4 % (ref 0–1.9)
BILIRUB SERPL-MCNC: 0.4 MG/DL (ref 0.1–1)
BUN SERPL-MCNC: 10 MG/DL (ref 6–20)
CALCIUM SERPL-MCNC: 8.5 MG/DL (ref 8.7–10.5)
CHLORIDE SERPL-SCNC: 107 MMOL/L (ref 95–110)
CO2 SERPL-SCNC: 29 MMOL/L (ref 23–29)
CREAT SERPL-MCNC: 0.8 MG/DL (ref 0.5–1.4)
DIFFERENTIAL METHOD BLD: ABNORMAL
EOSINOPHIL # BLD AUTO: 0 K/UL (ref 0–0.5)
EOSINOPHIL NFR BLD: 0.1 % (ref 0–8)
ERYTHROCYTE [DISTWIDTH] IN BLOOD BY AUTOMATED COUNT: 20.3 % (ref 11.5–14.5)
EST. GFR  (NO RACE VARIABLE): >60 ML/MIN/1.73 M^2
GLUCOSE SERPL-MCNC: 121 MG/DL (ref 70–110)
HCT VFR BLD AUTO: 31.6 % (ref 37–48.5)
HGB BLD-MCNC: 9 G/DL (ref 12–16)
IMM GRANULOCYTES # BLD AUTO: 0.02 K/UL (ref 0–0.04)
IMM GRANULOCYTES NFR BLD AUTO: 0.3 % (ref 0–0.5)
LYMPHOCYTES # BLD AUTO: 1.4 K/UL (ref 1–4.8)
LYMPHOCYTES NFR BLD: 19.6 % (ref 18–48)
MCH RBC QN AUTO: 21.5 PG (ref 27–31)
MCHC RBC AUTO-ENTMCNC: 28.5 G/DL (ref 32–36)
MCV RBC AUTO: 76 FL (ref 82–98)
MONOCYTES # BLD AUTO: 0.6 K/UL (ref 0.3–1)
MONOCYTES NFR BLD: 8 % (ref 4–15)
NEUTROPHILS # BLD AUTO: 5.3 K/UL (ref 1.8–7.7)
NEUTROPHILS NFR BLD: 71.6 % (ref 38–73)
NRBC BLD-RTO: 0 /100 WBC
PLATELET # BLD AUTO: 283 K/UL (ref 150–450)
PMV BLD AUTO: 10.1 FL (ref 9.2–12.9)
POTASSIUM SERPL-SCNC: 4 MMOL/L (ref 3.5–5.1)
PROT SERPL-MCNC: 6.6 G/DL (ref 6–8.4)
RBC # BLD AUTO: 4.18 M/UL (ref 4–5.4)
SODIUM SERPL-SCNC: 141 MMOL/L (ref 136–145)
WBC # BLD AUTO: 7.35 K/UL (ref 3.9–12.7)

## 2024-09-14 PROCEDURE — 80053 COMPREHEN METABOLIC PANEL: CPT | Performed by: NURSE PRACTITIONER

## 2024-09-14 PROCEDURE — 85025 COMPLETE CBC W/AUTO DIFF WBC: CPT | Performed by: NURSE PRACTITIONER

## 2024-09-14 PROCEDURE — 36415 COLL VENOUS BLD VENIPUNCTURE: CPT | Performed by: NURSE PRACTITIONER

## 2024-09-14 PROCEDURE — 94761 N-INVAS EAR/PLS OXIMETRY MLT: CPT

## 2024-09-14 PROCEDURE — 25000003 PHARM REV CODE 250: Performed by: STUDENT IN AN ORGANIZED HEALTH CARE EDUCATION/TRAINING PROGRAM

## 2024-09-14 RX ORDER — HYDROCODONE BITARTRATE AND ACETAMINOPHEN 5; 325 MG/1; MG/1
1 TABLET ORAL EVERY 6 HOURS PRN
Qty: 28 TABLET | Refills: 0 | Status: SHIPPED | OUTPATIENT
Start: 2024-09-14

## 2024-09-14 RX ORDER — ONDANSETRON 4 MG/1
4 TABLET, ORALLY DISINTEGRATING ORAL 2 TIMES DAILY
Qty: 15 TABLET | Refills: 0 | Status: SHIPPED | OUTPATIENT
Start: 2024-09-14

## 2024-09-14 RX ORDER — HYDROCODONE BITARTRATE AND ACETAMINOPHEN 5; 325 MG/1; MG/1
1 TABLET ORAL EVERY 6 HOURS PRN
Qty: 28 TABLET | Refills: 0 | Status: SHIPPED | OUTPATIENT
Start: 2024-09-14 | End: 2024-09-14 | Stop reason: SDUPTHER

## 2024-09-14 RX ADMIN — HYDROCODONE BITARTRATE AND ACETAMINOPHEN 1 TABLET: 5; 325 TABLET ORAL at 08:09

## 2024-09-14 RX ADMIN — HYDROCODONE BITARTRATE AND ACETAMINOPHEN 1 TABLET: 5; 325 TABLET ORAL at 01:09

## 2024-09-14 NOTE — DISCHARGE SUMMARY
[facility]  Discharge Summary      Patient Name: Raina Hoffman  MRN: [id number]  PAUL: [id number]  Patient Class: IP- Inpatient  Admission Date: 9/12/2024  Hospital Length of Stay: 1 days  Discharge Date and Time:  09/14/2024 10:23 AM  Attending Physician: Riddhi Araya MD   Discharging Provider: Vivian Daugherty NP  Primary Care Provider: Laura Abdalla FNP-C    Primary Care Team: Networked reference to record PCT     HPI:   Ms. Hoffman is a 43-year-old female with a past medical history of depression and cholelithiasis.  She presented to [facility] ED with complaints of acute abdominal pain.  Started 3 hours PTA and associated with nausea and vomiting.  While in the ED, she had an abdominal ultrasound which showed signs of acute calculous cholecystitis and possible choledocholithiasis.  She was initiated on IV Zosyn, IV pain medication, and IV antiemetics.  Case was discussed both with General surgery and GI, who recommended transfer to [facility] for MRCP vs. ERCP. Shortly after being transferred to [facility], she underwent an MRCP which showed cholelithiasis and a normal CBD.  She was then taken to surgery by Dr. Warner and had a lap cholecystectomy.  She tolerated the procedure well.  She currently reports her pain controlled and denies nausea.  She will be started on a clear liquid diet and advanced as tolerated.  LFTs will be rechecked in the morning.    Procedure(s) (LRB):  CHOLECYSTECTOMY, LAPAROSCOPIC (N/A)      Hospital Course:   Ms. Hoffman was admitted for acute calculous cholecystitis.  While here, her labs and vital signs were monitored.  She was placed on empiric IV Zosyn and General surgery and GI were consulted.  She underwent an MRCP which was negative for CBD dilatation or choledocholithiasis.  She then had a laparoscopic cholecystectomy per Dr. Warner and tolerated the procedure well.  She did not have any post operative complications.  Her LFTs were  mildly elevated and remained stable. Her diet was advanced to regular, which she tolerated.  She will be discharged to home today when cleared by surgery.  She will follow up with her PCP and General surgery.  She did not have any adverse events while here.     Goals of Care Treatment Preferences:  Code Status: Full Code      SDOH Screening:  The patient was screened for utility difficulties, food insecurity, transport difficulties, housing insecurity, and interpersonal safety and there were no concerns identified this admission.     Consults:   Consults (From admission, onward)          Status Ordering Provider     Inpatient consult to Gastroenterology  Once        Provider:  Sherie Pineda MD    Acknowledged ROSARIO WARNER     Inpatient consult to General Surgery  Once        Provider:  Jayden Mccullough III, MD    Completed SUPRIYA KABA            No new Assessment & Plan notes have been filed under this hospital service since the last note was generated.  Service: Hospital Medicine    Final Active Diagnoses:    Diagnosis Date Noted POA    PRINCIPAL PROBLEM:  Acute calculous cholecystitis [K80.00] 09/13/2024 Yes    Depression [F32.A] 09/13/2024 Yes      Problems Resolved During this Admission:       Discharged Condition: stable    Disposition:     Follow Up:   Follow-up Information       Rosario Warner MD Follow up in 2 day(s).    Specialty: General Surgery  Contact information:  [address]  [address]  [place] [address]  [phone]               Laura Abdalla FNP-C Follow up in 2 day(s).    Specialty: Family Medicine  Why: 2-3 weeks  Contact information:  [address]  [address]  [place] [address]  [phone]                           Patient Instructions:      Diet Adult Regular     Notify your health care provider if you experience any of the following:  temperature >100.4     Notify your health care provider if you experience any of the following:  severe uncontrolled pain      Notify your health care provider if you experience any of the following:  persistent nausea and vomiting or diarrhea     Notify your health care provider if you experience any of the following:  redness, tenderness, or signs of infection (pain, swelling, redness, odor or green/yellow discharge around incision site)     Notify your health care provider if you experience any of the following:  difficulty breathing or increased cough     Activity as tolerated       Significant Diagnostic Studies: Labs: CMP   Recent Labs   Lab 09/13/24  0003 09/14/24  0840    141   K 4.1 4.0    107   CO2 20* 29   * 121*   BUN 13 10   CREATININE 0.8 0.8   CALCIUM 9.5 8.5*   PROT 7.9 6.6   ALBUMIN 3.9 3.7   BILITOT 0.3 0.4   ALKPHOS 91 66   AST 63* 56*   ALT 88* 83*   ANIONGAP 14 5*   , CBC   Recent Labs   Lab 09/13/24  0003 09/14/24  0840   WBC 7.35 7.35   HGB 10.3* 9.0*   HCT 35.1* 31.6*    283   , and All labs within the past 24 hours have been reviewed  Radiology: MRI MRCP Abdomen WO Contrast 3D WO Independent WS XPD  Narrative: EXAMINATION:  MRI MRCP ABDOMEN WO CONTRAST 3D WO INDEPENDENT WS XPD    CLINICAL HISTORY:  15 mm cbd;    TECHNIQUE:  MRCP protocol MRI abdomen without IV contrast    COMPARISON:  Abdominal ultrasound 09/13/2024.  CT abdomen pelvis 01/06/2022.    FINDINGS:  The gallbladder is mildly distended.  There is a stone in the fundus of the gallbladder.  No gallbladder wall thickening or pericholecystic fluid observed.  The common bile duct is normal caliber measuring approximately 6 mm.  No common bile duct obstruction or stenosis observed.    There is a cyst in the right hepatic lobe measuring 1.2 cm.  Liver is otherwise unremarkable.  Pancreatic duct and pancreas are within normal limits.  The spleen is enlarged measuring 16 cm.  No splenic lesions observed.  Adrenal glands unremarkable.  There is a cyst in the midpole right kidney measuring 8 mm.  The kidneys are otherwise unremarkable.   The visualized large and small bowel are unremarkable.  Impression: 1. Cholelithiasis.  2. Common bile duct is normal caliber.    Electronically signed by: Fredy Knight  Date:    09/13/2024  Time:    10:16  US Abdomen Limited  Narrative: EXAMINATION:  US ABDOMEN LIMITED    CLINICAL HISTORY:  Abdominal Pain - Gallbladder;    TECHNIQUE:  Limited ultrasound of the right upper quadrant of the abdomen (including pancreas, liver, gallbladder, common bile duct, and spleen) was performed.    COMPARISON:  None.    FINDINGS:  Liver: Normal in size, measuring 18.6 cm. There is diffuse increased echogenicity of the liver parenchyma suggesting fatty infiltration without  focal hepatic lesions.    Gallbladder: 2 gallstones are noted.  Edema or thickening of the gallbladder wall is not seen measuring 3 mm.    Biliary system: The common duct is dilated, measuring 15 mm.  A stone within the common bile duct is not dilated on this modality.  No intrahepatic ductal dilatation.    The right kidney measures 10.1 cm without mass or hydronephrosis.    Miscellaneous: No upper abdominal ascites.  Impression: Cholelithiasis.  Dilated common bile duct without a point of obstruction identified.  Fatty infiltration of the liver parenchyma.    Electronically signed by: Rosales Huang MD  Date:    09/13/2024  Time:    08:09        Pending Diagnostic Studies:       Procedure Component Value Units Date/Time    Specimen to Pathology - Surgery [[id number]] Collected: 09/13/24 1400    Order Status: Sent Lab Status: No result     Specimen: Tissue            Medications:  Reconciled Home Medications:      Medication List        START taking these medications      HYDROcodone-acetaminophen 5-325 mg per tablet  Commonly known as: NORCO  Take 1 tablet by mouth every 6 (six) hours as needed for Pain.     ondansetron 4 MG Tbdl  Commonly known as: ZOFRAN-ODT  Take 1 tablet (4 mg total) by mouth 2 (two) times daily.            CHANGE how you take these  medications      sertraline 100 MG tablet  Commonly known as: ZOLOFT  TAKE 1 TABLET(100 MG) BY MOUTH EVERY DAY  What changed: when to take this     VRAYLAR 1.5 mg Cap  Generic drug: cariprazine  TAKE 1 CAPSULE(1.5 MG) BY MOUTH EVERY DAY  What changed: See the new instructions.            CONTINUE taking these medications      fluticasone propionate 50 mcg/actuation nasal spray  Commonly known as: FLONASE  1 spray (50 mcg total) by Each Nostril route 2 (two) times daily as needed for Rhinitis.              Indwelling Lines/Drains at time of discharge:   Lines/Drains/Airways       None                   Time spent on the discharge of patient: 34 minutes         Vivian Daugherty NP  Department of Hospital Medicine  [facility]

## 2024-09-14 NOTE — PLAN OF CARE
Problem: Adult Inpatient Plan of Care  Goal: Plan of Care Review  9/14/2024 0756 by Kristal Cobian RN  Outcome: Progressing  9/14/2024 0657 by Kristal Cobian RN  Outcome: Progressing  Goal: Patient-Specific Goal (Individualized)  9/14/2024 0756 by Kristal Cobian RN  Outcome: Progressing  9/14/2024 0657 by Kristal Cobian RN  Outcome: Progressing  Goal: Absence of Hospital-Acquired Illness or Injury  9/14/2024 0756 by Kristal Cobian RN  Outcome: Progressing  9/14/2024 0657 by Kristal Cobian RN  Outcome: Progressing  Goal: Optimal Comfort and Wellbeing  9/14/2024 0756 by Kristal Cobian RN  Outcome: Progressing  9/14/2024 0657 by Kristal Cobian RN  Outcome: Progressing  Goal: Readiness for Transition of Care  9/14/2024 0756 by Kristal Cobian RN  Outcome: Progressing  9/14/2024 0657 by Kristal Cobian RN  Outcome: Progressing     Problem: Bariatric Environmental Safety  Goal: Safety Maintained with Care  9/14/2024 0756 by Kristal Cobian RN  Outcome: Progressing  9/14/2024 0657 by Kristal Cobian RN  Outcome: Progressing     Problem: Wound  Goal: Optimal Coping  9/14/2024 0756 by Kristal Cobian RN  Outcome: Progressing  9/14/2024 0657 by Kristal Cobian RN  Outcome: Progressing  Goal: Optimal Functional Ability  9/14/2024 0756 by Kristal Cobian RN  Outcome: Progressing  9/14/2024 0657 by Kristal Cobian RN  Outcome: Progressing  Goal: Absence of Infection Signs and Symptoms  9/14/2024 0756 by Kristal Cobian RN  Outcome: Progressing  9/14/2024 0657 by Kristal Cobian RN  Outcome: Progressing  Goal: Improved Oral Intake  9/14/2024 0756 by Kristal Cobian RN  Outcome: Progressing  9/14/2024 0657 by Kristal Cobian RN  Outcome: Progressing  Goal: Optimal Pain Control and Function  9/14/2024 0756 by Kristal Cobian RN  Outcome: Progressing  9/14/2024 0657 by Kristal Cobian RN  Outcome: Progressing  Goal: Skin Health and Integrity  9/14/2024 0756 by Kristal Cobian RN  Outcome: Progressing  9/14/2024 0657 by Kristal Cobian RN  Outcome: Progressing  Goal: Optimal Wound Healing  9/14/2024 0756 by Mango  ARIEL Giraldo  Outcome: Progressing  9/14/2024 0657 by Kristal Cobian RN  Outcome: Progressing

## 2024-09-14 NOTE — PLAN OF CARE
Problem: Adult Inpatient Plan of Care  Goal: Plan of Care Review  9/14/2024 1330 by Kristal Cobian RN  Outcome: Met  9/14/2024 0756 by Kristal Cobian RN  Outcome: Progressing  9/14/2024 0657 by Kristal Cobian RN  Outcome: Progressing  Goal: Patient-Specific Goal (Individualized)  9/14/2024 1330 by Kristal Cobian RN  Outcome: Met  9/14/2024 0756 by Kristal Cobian RN  Outcome: Progressing  9/14/2024 0657 by Kristal Cobian RN  Outcome: Progressing  Goal: Absence of Hospital-Acquired Illness or Injury  9/14/2024 1330 by Kristal Cobian RN  Outcome: Met  9/14/2024 0756 by Kristal Cobian RN  Outcome: Progressing  9/14/2024 0657 by Kristal Cobian RN  Outcome: Progressing  Goal: Optimal Comfort and Wellbeing  9/14/2024 1330 by Kristal Cobian RN  Outcome: Met  9/14/2024 0756 by Kristal Cobian RN  Outcome: Progressing  9/14/2024 0657 by Kristal Cobian RN  Outcome: Progressing  Goal: Readiness for Transition of Care  9/14/2024 1330 by Kristal Cobian RN  Outcome: Met  9/14/2024 0756 by Kristal Cobian RN  Outcome: Progressing  9/14/2024 0657 by Kristal Cobian RN  Outcome: Progressing     Problem: Bariatric Environmental Safety  Goal: Safety Maintained with Care  9/14/2024 1330 by Kristal Cobian RN  Outcome: Met  9/14/2024 0756 by Kristal Cobian RN  Outcome: Progressing  9/14/2024 0657 by Kristal Cobian RN  Outcome: Progressing     Problem: Wound  Goal: Optimal Coping  9/14/2024 1330 by Kristal Cobian RN  Outcome: Met  9/14/2024 0756 by Kristal Cobian RN  Outcome: Progressing  9/14/2024 0657 by Kristal Cobian RN  Outcome: Progressing  Goal: Optimal Functional Ability  9/14/2024 1330 by Kristal Cobian RN  Outcome: Met  9/14/2024 0756 by Kristal Cobian RN  Outcome: Progressing  9/14/2024 0657 by Kristal Cobian RN  Outcome: Progressing  Goal: Absence of Infection Signs and Symptoms  9/14/2024 1330 by Kristal Cobian RN  Outcome: Met  9/14/2024 0756 by Kristal Cobian, RN  Outcome: Progressing  9/14/2024 0657 by Kristal Cobian, RN  Outcome: Progressing  Goal: Improved Oral Intake  9/14/2024 1330 by Kristal Cobian,  RN  Outcome: Met  9/14/2024 0756 by Kristal Cobian RN  Outcome: Progressing  9/14/2024 0657 by Kristal Cobian RN  Outcome: Progressing  Goal: Optimal Pain Control and Function  9/14/2024 1330 by Kristal Cobian RN  Outcome: Met  9/14/2024 0756 by Kristal Cobian RN  Outcome: Progressing  9/14/2024 0657 by Kristal Cobian RN  Outcome: Progressing  Goal: Skin Health and Integrity  9/14/2024 1330 by Kristal Cobian RN  Outcome: Met  9/14/2024 0756 by Kristal Cobian RN  Outcome: Progressing  9/14/2024 0657 by Kristal Cobian RN  Outcome: Progressing  Goal: Optimal Wound Healing  9/14/2024 1330 by Krsital Cobian RN  Outcome: Met  9/14/2024 0756 by Kristal Cobian RN  Outcome: Progressing  9/14/2024 0657 by Kristal Cobian RN  Outcome: Progressing

## 2024-09-14 NOTE — PLAN OF CARE
Problem: Adult Inpatient Plan of Care  Goal: Absence of Hospital-Acquired Illness or Injury  Outcome: Progressing  Goal: Optimal Comfort and Wellbeing  Outcome: Progressing  Goal: Readiness for Transition of Care  Outcome: Progressing     Problem: Bariatric Environmental Safety  Goal: Safety Maintained with Care  Outcome: Progressing     Problem: Wound  Goal: Optimal Coping  Outcome: Progressing  Goal: Optimal Functional Ability  Outcome: Progressing  Goal: Absence of Infection Signs and Symptoms  Outcome: Progressing  Goal: Improved Oral Intake  Outcome: Progressing  Goal: Optimal Pain Control and Function  Outcome: Progressing  Goal: Skin Health and Integrity  Outcome: Progressing  Goal: Optimal Wound Healing  Outcome: Progressing

## 2024-09-14 NOTE — PLAN OF CARE
Discharge orders and chart reviewed with no further post-acute discharge needs identified at this time.    Follow-up instructions sent to discharge clinic via careport.    Pt will discharge home via family transport.  At this time, patient is cleared for discharge from Case Management standpoint.       09/14/24 0956   Final Note   Assessment Type Final Discharge Note   Anticipated Discharge Disposition Home   What phone number can be called within the next 1-3 days to see how you are doing after discharge? [phone]  ([phone])   Post-Acute Status   [facility] [place] [facility] [place] [facility]   Discharge Delays None known at this time

## 2024-09-14 NOTE — HOSPITAL COURSE
Ms. Hoffman was admitted for acute calculous cholecystitis.  While here, her labs and vital signs were monitored.  She was placed on empiric IV Zosyn and General surgery and GI were consulted.  She underwent an MRCP which was negative for CBD dilatation or choledocholithiasis.  She then had a laparoscopic cholecystectomy per Dr. Warner and tolerated the procedure well.  She did not have any post operative complications.  Her LFTs were mildly elevated and remained stable. Her diet was advanced to regular, which she tolerated.  She will be discharged to home today when cleared by surgery.  She will follow up with her PCP and General surgery.  She did not have any adverse events while here.

## 2024-09-16 ENCOUNTER — TELEPHONE (OUTPATIENT)
Dept: SURGERY | Facility: CLINIC | Age: 44
End: 2024-09-16
Payer: MEDICAID

## 2024-09-16 ENCOUNTER — PATIENT OUTREACH (OUTPATIENT)
Dept: FAMILY MEDICINE | Facility: CLINIC | Age: 44
End: 2024-09-16
Payer: MEDICAID

## 2024-09-16 NOTE — TELEPHONE ENCOUNTER
----- Message from Kassandra Brink MA sent at 9/16/2024  1:52 PM CDT -----  Pt had Gallbladder removed 9/13, was told to be seen within 2 weeks  Please call her at [phone]  Thanks !  Please do NOT respond directly back to me, any questions, reply to staff box since this inbox is not routinely monitored

## 2024-09-17 ENCOUNTER — PATIENT OUTREACH (OUTPATIENT)
Dept: FAMILY MEDICINE | Facility: CLINIC | Age: 44
End: 2024-09-17
Payer: MEDICAID

## 2024-09-24 ENCOUNTER — OFFICE VISIT (OUTPATIENT)
Dept: PRIMARY CARE CLINIC | Facility: CLINIC | Age: 44
End: 2024-09-24
Payer: MEDICAID

## 2024-09-24 VITALS
TEMPERATURE: 98 F | WEIGHT: 265.56 LBS | OXYGEN SATURATION: 97 % | HEART RATE: 78 BPM | SYSTOLIC BLOOD PRESSURE: 128 MMHG | DIASTOLIC BLOOD PRESSURE: 88 MMHG | BODY MASS INDEX: 42.68 KG/M2 | HEIGHT: 66 IN

## 2024-09-24 DIAGNOSIS — R10.10 UPPER ABDOMINAL PAIN: Primary | ICD-10-CM

## 2024-09-24 DIAGNOSIS — D64.9 ANEMIA, UNSPECIFIED TYPE: ICD-10-CM

## 2024-09-24 DIAGNOSIS — Z90.49 S/P CHOLECYSTECTOMY: ICD-10-CM

## 2024-09-24 DIAGNOSIS — R79.89 ELEVATED LFTS: ICD-10-CM

## 2024-09-24 PROCEDURE — 3074F SYST BP LT 130 MM HG: CPT | Mod: CPTII,,, | Performed by: NURSE PRACTITIONER

## 2024-09-24 PROCEDURE — 99495 TRANSJ CARE MGMT MOD F2F 14D: CPT | Mod: S$PBB,,, | Performed by: NURSE PRACTITIONER

## 2024-09-24 PROCEDURE — 1111F DSCHRG MED/CURRENT MED MERGE: CPT | Mod: CPTII,,, | Performed by: NURSE PRACTITIONER

## 2024-09-24 PROCEDURE — 1159F MED LIST DOCD IN RCRD: CPT | Mod: CPTII,,, | Performed by: NURSE PRACTITIONER

## 2024-09-24 PROCEDURE — 99999 PR PBB SHADOW E&M-EST. PATIENT-LVL III: CPT | Mod: PBBFAC,,, | Performed by: NURSE PRACTITIONER

## 2024-09-24 PROCEDURE — 99213 OFFICE O/P EST LOW 20 MIN: CPT | Mod: PBBFAC,PN | Performed by: NURSE PRACTITIONER

## 2024-09-24 PROCEDURE — 3079F DIAST BP 80-89 MM HG: CPT | Mod: CPTII,,, | Performed by: NURSE PRACTITIONER

## 2024-09-24 NOTE — PROGRESS NOTES
This dictation has been generated using Modal Fluency Dictation some phonetic errors may occur. Please contact author for clarification if needed.     Problem List Items Addressed This Visit    None  Visit Diagnoses       S/P cholecystectomy    -  Primary    Upper abdominal pain        Elevated LFTs        Anemia, unspecified type                   S/P Callie - patient feeling better. Surgical sites SAAD, without signs of infection. F/U Apt with Dr Warner 9/26.   Low fat diet. Avoid: heavy, greasy, spicy, fried foods. Drink plenty of water.  Anemia. H&H low- appears to be ABBI from past labs. Follow up with PCP for evaluation. LFT trended to normal. Monitor annually.   All labs reviewed and addressed accordingly.  Patient agreeable to treatment plan.    No follow-ups on file.    ________________________________________________________________  ________________________________________________________________      Chief Complaint   Patient presents with    Follow-up     Hosp f/u gallbladder removal 9/13/24     History of present illness    Transitional Care Note    Family and/or Caretaker present at visit?  No.  Diagnostic tests reviewed/disposition: No diagnosic tests pending after this hospitalization.  Disease/illness education: Low fat diet- avoid greasy/heavy meals.  Home health/community services discussion/referrals: Patient does not have home health established from hospital visit.  They do not need home health.  If needed, we will set up home health for the patient.   Establishment or re-establishment of referral orders for community resources: No other necessary community resources.   Discussion with other health care providers:  Not indicated.       This 43 y.o. presents today for hospital follow up for s/p cholecystectomy. Patient reports feeling well overall and tolerating regular diet well. Reports no GI symptoms. Discussed avoiding/cutting back on fatty foods. Informed patient of stable anemia, she is  agreeable to follow-up with PCP. Asking about resuming driving, instructed to get clearance from Dr Warner at apt on 9/26.     (D/C summary)  Admission Date: 9/12/2024  Hospital Length of Stay: 1 days  Discharge Date and Time:  09/14/2024 10:23 AM  Attending Physician: Riddhi Araya MD   Discharging Provider: Vivian Daugherty NP  Primary Care Provider: Laura Abdalla, FNP-CLARITZA     Primary Care Team: Networked reference to record PCT      HPI:   Ms. Hoffman is a 43-year-old female with a past medical history of depression and cholelithiasis.  She presented to [facility] ED with complaints of acute abdominal pain.  Started 3 hours PTA and associated with nausea and vomiting.  While in the ED, she had an abdominal ultrasound which showed signs of acute calculous cholecystitis and possible choledocholithiasis.  She was initiated on IV Zosyn, IV pain medication, and IV antiemetics.  Case was discussed both with General surgery and GI, who recommended transfer to [facility] for MRCP vs. ERCP. Shortly after being transferred to [facility], she underwent an MRCP which showed cholelithiasis and a normal CBD.  She was then taken to surgery by Dr. Warner and had a lap cholecystectomy.  She tolerated the procedure well.  She currently reports her pain controlled and denies nausea.  She will be started on a clear liquid diet and advanced as tolerated.  LFTs will be rechecked in the morning.     Procedure(s) (LRB):  CHOLECYSTECTOMY, LAPAROSCOPIC (N/A)       Hospital Course:   Ms. Hoffman was admitted for acute calculous cholecystitis.  While here, her labs and vital signs were monitored.  She was placed on empiric IV Zosyn and General surgery and GI were consulted.  She underwent an MRCP which was negative for CBD dilatation or choledocholithiasis.  She then had a laparoscopic cholecystectomy per Dr. Warner and tolerated the procedure well.  She did not have any post operative complications.  Her LFTs were mildly elevated  and remained stable. Her diet was advanced to regular, which she tolerated.  She will be discharged to home today when cleared by surgery.  She will follow up with her PCP and General surgery.  She did not have any adverse events while here.     Past Medical History:   Diagnosis Date    Anxiety     Depression        Past Surgical History:   Procedure Laterality Date    APPENDECTOMY       SECTION      EYE SURGERY      LAPAROSCOPIC CHOLECYSTECTOMY N/A 2024    Procedure: CHOLECYSTECTOMY, LAPAROSCOPIC;  Surgeon: Benji Warner MD;  Location: [facility];  Service: General;  Laterality: N/A;    TUBAL LIGATION         Family History   Problem Relation Name Age of Onset    No Known Problems Mother      Hypertension Father      Thyroid disease Father         Social History     Socioeconomic History    Marital status:     Number of children: 2   Tobacco Use    Smoking status: Never     Passive exposure: Never    Smokeless tobacco: Never   Substance and Sexual Activity    Alcohol use: Yes     Comment: socially    Drug use: Never    Sexual activity: Yes     Partners: Male     Social Determinants of Health     Financial Resource Strain: Low Risk  (2024)    Overall Financial Resource Strain (CARDIA)     Difficulty of Paying Living Expenses: Not hard at all   Food Insecurity: No Food Insecurity (2024)    Hunger Vital Sign     Worried About Running Out of Food in the Last Year: Never true     Ran Out of Food in the Last Year: Never true   Transportation Needs: No Transportation Needs (2024)    TRANSPORTATION NEEDS     Transportation : No   Physical Activity: Inactive (2024)    Exercise Vital Sign     Days of Exercise per Week: 0 days     Minutes of Exercise per Session: 0 min   Stress: Stress Concern Present (1/10/2024)    [employer] - Occupational Stress Questionnaire     Feeling of Stress : Very much   Housing Stability: Low Risk  (2024)    Housing  "Stability Vital Sign     Unable to Pay for Housing in the Last Year: No     Homeless in the Last Year: No       Current Outpatient Medications   Medication Sig Dispense Refill    HYDROcodone-acetaminophen (NORCO) 5-325 mg per tablet Take 1 tablet by mouth every 6 (six) hours as needed for Pain. 28 tablet 0    ondansetron (ZOFRAN-ODT) 4 MG TbDL Take 1 tablet (4 mg total) by mouth 2 (two) times daily. 15 tablet 0    sertraline (ZOLOFT) 100 MG tablet TAKE 1 TABLET(100 MG) BY MOUTH EVERY DAY (Patient taking differently: Take 100 mg by mouth once daily.) 30 tablet 2    fluticasone propionate (FLONASE) 50 mcg/actuation nasal spray 1 spray (50 mcg total) by Each Nostril route 2 (two) times daily as needed for Rhinitis. (Patient not taking: Reported on 9/24/2024) 15 g 0    VRAYLAR 1.5 mg Cap TAKE 1 CAPSULE(1.5 MG) BY MOUTH EVERY DAY (Patient not taking: Reported on 9/24/2024) 30 capsule 5     No current facility-administered medications for this visit.       Review of patient's allergies indicates:  No Known Allergies    Physical examination  Vitals Reviewed  /88 (BP Location: Left arm, Patient Position: Sitting, BP Method: Large (Manual))   Pulse 78   Temp 97.6 °F (36.4 °C) (Oral)   Ht 5' 6" (1.676 m)   Wt 120.5 kg (265 lb 8.7 oz)   SpO2 97%   BMI 42.86 kg/m²  Body mass index is 42.86 kg/m².     BP Readings from Last 3 Encounters:   09/24/24 128/88   09/14/24 129/81   03/28/24 (!) 160/78       Wt Readings from Last 3 Encounters:   09/24/24 120.5 kg (265 lb 8.7 oz)   09/13/24 113.4 kg (250 lb)   04/05/24 104.3 kg (229 lb 15 oz)     Gen. Well-dressed well-nourished   Skin warm dry and intact.  No rashes noted. Surgical sites SAAD, Clean and dry, without signs of infection.  Chest.  Respirations are even unlabored.  Lungs are clear to auscultation.  Cardiac regular rate and rhythm.  No chest wall adenopathy noted.  Abd. Soft, non-distended to palpation. Normoactive BS all quadrants. Surgical sites SAAD, Clean and " dry, without signs of infection.  Neuro. Awake alert oriented x4.  Normal judgment and cognition noted.  Extremities no clubbing cyanosis or edema noted.     Call or return to clinic prn if these symptoms worsen or fail to improve as anticipated.

## 2024-09-26 ENCOUNTER — OFFICE VISIT (OUTPATIENT)
Dept: SURGERY | Facility: CLINIC | Age: 44
End: 2024-09-26
Payer: MEDICAID

## 2024-09-26 VITALS
HEART RATE: 73 BPM | DIASTOLIC BLOOD PRESSURE: 80 MMHG | TEMPERATURE: 98 F | RESPIRATION RATE: 16 BRPM | SYSTOLIC BLOOD PRESSURE: 133 MMHG | WEIGHT: 264.56 LBS | BODY MASS INDEX: 42.52 KG/M2 | HEIGHT: 66 IN

## 2024-09-26 DIAGNOSIS — Z98.890 POST-OPERATIVE STATE: Primary | ICD-10-CM

## 2024-09-26 PROCEDURE — 3075F SYST BP GE 130 - 139MM HG: CPT | Mod: CPTII,,, | Performed by: STUDENT IN AN ORGANIZED HEALTH CARE EDUCATION/TRAINING PROGRAM

## 2024-09-26 PROCEDURE — 99999 PR PBB SHADOW E&M-EST. PATIENT-LVL III: CPT | Mod: PBBFAC,,, | Performed by: STUDENT IN AN ORGANIZED HEALTH CARE EDUCATION/TRAINING PROGRAM

## 2024-09-26 PROCEDURE — 1159F MED LIST DOCD IN RCRD: CPT | Mod: CPTII,,, | Performed by: STUDENT IN AN ORGANIZED HEALTH CARE EDUCATION/TRAINING PROGRAM

## 2024-09-26 PROCEDURE — 99213 OFFICE O/P EST LOW 20 MIN: CPT | Mod: PBBFAC,PN | Performed by: STUDENT IN AN ORGANIZED HEALTH CARE EDUCATION/TRAINING PROGRAM

## 2024-09-26 PROCEDURE — 3079F DIAST BP 80-89 MM HG: CPT | Mod: CPTII,,, | Performed by: STUDENT IN AN ORGANIZED HEALTH CARE EDUCATION/TRAINING PROGRAM

## 2024-09-26 PROCEDURE — 99024 POSTOP FOLLOW-UP VISIT: CPT | Mod: ,,, | Performed by: STUDENT IN AN ORGANIZED HEALTH CARE EDUCATION/TRAINING PROGRAM

## 2024-09-26 NOTE — PROGRESS NOTES
Postop note     Patient underwent cholecystectomy for cholecystitis.      Incisions are healing well    Pathology: Benign gallbladder     Overall doing well.  Follow up as needed.

## 2024-10-03 ENCOUNTER — OFFICE VISIT (OUTPATIENT)
Dept: URGENT CARE | Facility: CLINIC | Age: 44
End: 2024-10-03
Payer: MEDICAID

## 2024-10-03 VITALS
BODY MASS INDEX: 40.18 KG/M2 | OXYGEN SATURATION: 95 % | WEIGHT: 250 LBS | RESPIRATION RATE: 18 BRPM | TEMPERATURE: 98 F | DIASTOLIC BLOOD PRESSURE: 89 MMHG | HEIGHT: 66 IN | SYSTOLIC BLOOD PRESSURE: 132 MMHG | HEART RATE: 84 BPM

## 2024-10-03 DIAGNOSIS — R39.15 URGENCY OF URINATION: Primary | ICD-10-CM

## 2024-10-03 DIAGNOSIS — N30.01 ACUTE CYSTITIS WITH HEMATURIA: ICD-10-CM

## 2024-10-03 LAB
B-HCG UR QL: NEGATIVE
BILIRUB UR QL STRIP: NEGATIVE
CTP QC/QA: YES
GLUCOSE UR QL STRIP: NEGATIVE
KETONES UR QL STRIP: NEGATIVE
LEUKOCYTE ESTERASE UR QL STRIP: POSITIVE
PH, POC UA: 6
POC BLOOD, URINE: POSITIVE
POC NITRATES, URINE: NEGATIVE
PROT UR QL STRIP: POSITIVE
SP GR UR STRIP: 1.02 (ref 1–1.03)
UROBILINOGEN UR STRIP-ACNC: 0.2 (ref 0.1–1.1)

## 2024-10-03 PROCEDURE — 81003 URINALYSIS AUTO W/O SCOPE: CPT | Mod: QW,S$GLB,, | Performed by: STUDENT IN AN ORGANIZED HEALTH CARE EDUCATION/TRAINING PROGRAM

## 2024-10-03 PROCEDURE — 81025 URINE PREGNANCY TEST: CPT | Mod: S$GLB,,, | Performed by: STUDENT IN AN ORGANIZED HEALTH CARE EDUCATION/TRAINING PROGRAM

## 2024-10-03 PROCEDURE — 99214 OFFICE O/P EST MOD 30 MIN: CPT | Mod: S$GLB,,, | Performed by: STUDENT IN AN ORGANIZED HEALTH CARE EDUCATION/TRAINING PROGRAM

## 2024-10-03 RX ORDER — SULFAMETHOXAZOLE AND TRIMETHOPRIM 800; 160 MG/1; MG/1
1 TABLET ORAL 2 TIMES DAILY
Qty: 14 TABLET | Refills: 0 | Status: SHIPPED | OUTPATIENT
Start: 2024-10-03 | End: 2024-10-10

## 2024-10-03 NOTE — PROGRESS NOTES
"Subjective:      Patient ID: Raina Hoffman is a 44 y.o. female.    Vitals:  height is 5' 6" (1.676 m) and weight is 113.4 kg (250 lb). Her temperature is 98.1 °F (36.7 °C). Her blood pressure is 132/89 and her pulse is 84. Her respiration is 18 and oxygen saturation is 95%.     Chief Complaint: Urinary Tract Infection    Patient is a 44-year-old female with a past medical history depression who presents to clinic for evaluation of possible UTI.  Patient reports symptoms about 1 week now.  Patient states took 1 azo with no significant relief to symptoms.  Patient states no recurrent history of UTIs however has had 1 in the past.  Patient states symptoms feel similar to that.  Patient reports no concern for STI or pregnancy.  Patient states that she has experienced cloudy urine, urinary urgency, and urinary frequency.  Patient denies any dysuria, urinary hesitancy or retention, flank pain or pelvic pain, vaginal discharge or bleeding, vaginal odor, abdominal pain, nausea or vomiting, fever or chills.    Urinary Tract Infection   This is a new problem. The current episode started in the past 7 days. The problem has been unchanged. The pain is at a severity of 3/10. The pain is mild. There has been no fever. Associated symptoms include frequency and urgency. Pertinent negatives include no chills, flank pain, nausea, vomiting or rash. Treatments tried: Azo last sunday.       Constitution: Negative. Negative for chills, sweating, fatigue and fever.   HENT: Negative.     Neck: neck negative.   Cardiovascular: Negative.  Negative for chest pain and palpitations.   Eyes: Negative.    Respiratory: Negative.  Negative for chest tightness, cough and shortness of breath.    Gastrointestinal: Negative.  Negative for abdominal pain, nausea, vomiting and diarrhea.   Endocrine: negative.   Genitourinary:  Positive for frequency and urgency. Negative for dysuria, urine decreased, flank pain, vaginal discharge, vaginal bleeding, " vaginal odor and pelvic pain.        Cloudy urine   Musculoskeletal: Negative.    Skin: Negative.  Negative for color change, pale, rash and erythema.   Allergic/Immunologic: Negative.    Neurological: Negative.  Negative for dizziness, light-headedness, passing out, headaches, disorientation and altered mental status.   Hematologic/Lymphatic: Negative.    Psychiatric/Behavioral: Negative.  Negative for altered mental status, disorientation and confusion.       Objective:     Physical Exam   Constitutional: She is oriented to person, place, and time. She appears well-developed. She is cooperative.  Non-toxic appearance. She does not appear ill. No distress. obesity  HENT:   Head: Normocephalic and atraumatic.   Ears:   Right Ear: Hearing and external ear normal.   Left Ear: Hearing and external ear normal.   Nose: Nose normal. No mucosal edema or nasal deformity. No epistaxis. Right sinus exhibits no maxillary sinus tenderness and no frontal sinus tenderness. Left sinus exhibits no maxillary sinus tenderness and no frontal sinus tenderness.   Mouth/Throat: Uvula is midline, oropharynx is clear and moist and mucous membranes are normal. No trismus in the jaw. Normal dentition. No uvula swelling.   Eyes: Conjunctivae and lids are normal. Pupils are equal, round, and reactive to light.   Neck: Trachea normal and phonation normal. Neck supple.   Cardiovascular: Normal rate, regular rhythm, normal heart sounds and normal pulses.   Pulmonary/Chest: Effort normal and breath sounds normal. No respiratory distress. She has no wheezes. She has no rhonchi. She has no rales.   Abdominal: Normal appearance and bowel sounds are normal. She exhibits no distension. Soft. There is no abdominal tenderness. There is no rebound, no guarding, no left CVA tenderness and no right CVA tenderness.   Musculoskeletal: Normal range of motion.         General: Normal range of motion.   Neurological: She is alert and oriented to person, place,  and time. She exhibits normal muscle tone.   Skin: Skin is warm, dry, intact, not diaphoretic, not pale and no rash. Capillary refill takes less than 2 seconds. No erythema   Psychiatric: Her speech is normal and behavior is normal. Judgment and thought content normal.   Nursing note and vitals reviewed.      Assessment:     1. Urgency of urination    2. Acute cystitis with hematuria        Plan:       Urgency of urination  -     POCT Urinalysis, Dipstick, Manual, W/O Scope  -     POCT urine pregnancy    Acute cystitis with hematuria    Other orders  -     sulfamethoxazole-trimethoprim 800-160mg (BACTRIM DS) 800-160 mg Tab; Take 1 tablet by mouth 2 (two) times daily. for 7 days  Dispense: 14 tablet; Refill: 0                Labs: UA: 500+ leukocyte, positive blood, negative nitrate   UPT: Negative   Take medications as prescribed.    Tylenol/Motrin per package instructions for any pain or fever.    Assure adequate hydration.    Follow-up with PCP in 1-2 days.    Return to clinic as needed.    To ED for any new or acutely worsening symptoms.    Patient in agreement with plan of care.    DISCLAIMER: Please note that my documentation in this Electronic Healthcare Record was produced using speech recognition software and therefore may contain errors related to that software system.These could include grammar, punctuation and spelling errors or the inclusion/exclusion of phrases that were not intended. Garbled syntax, mangled pronouns, and other bizarre constructions may be attributed to that software system.

## 2024-11-16 DIAGNOSIS — F32.A DEPRESSION, UNSPECIFIED DEPRESSION TYPE: ICD-10-CM

## 2024-11-18 RX ORDER — SERTRALINE HYDROCHLORIDE 100 MG/1
TABLET, FILM COATED ORAL
Qty: 30 TABLET | Refills: 2 | Status: SHIPPED | OUTPATIENT
Start: 2024-11-18

## 2025-01-02 ENCOUNTER — PATIENT MESSAGE (OUTPATIENT)
Dept: ADMINISTRATIVE | Facility: HOSPITAL | Age: 45
End: 2025-01-02
Payer: MEDICAID

## 2025-06-25 ENCOUNTER — LAB VISIT (OUTPATIENT)
Dept: LAB | Facility: HOSPITAL | Age: 45
End: 2025-06-25
Attending: NURSE PRACTITIONER
Payer: MEDICAID

## 2025-06-25 ENCOUNTER — OFFICE VISIT (OUTPATIENT)
Dept: FAMILY MEDICINE | Facility: CLINIC | Age: 45
End: 2025-06-25
Payer: MEDICAID

## 2025-06-25 VITALS
HEIGHT: 66 IN | WEIGHT: 241.38 LBS | BODY MASS INDEX: 38.79 KG/M2 | SYSTOLIC BLOOD PRESSURE: 136 MMHG | HEART RATE: 72 BPM | TEMPERATURE: 98 F | RESPIRATION RATE: 20 BRPM | DIASTOLIC BLOOD PRESSURE: 88 MMHG

## 2025-06-25 DIAGNOSIS — G25.2 FINE TREMOR: ICD-10-CM

## 2025-06-25 DIAGNOSIS — Z12.31 BREAST CANCER SCREENING BY MAMMOGRAM: ICD-10-CM

## 2025-06-25 DIAGNOSIS — Z01.419 WELL WOMAN EXAM: ICD-10-CM

## 2025-06-25 DIAGNOSIS — F32.A DEPRESSION, UNSPECIFIED DEPRESSION TYPE: ICD-10-CM

## 2025-06-25 DIAGNOSIS — D22.9 ATYPICAL NEVI: ICD-10-CM

## 2025-06-25 DIAGNOSIS — Z00.00 ROUTINE HEALTH MAINTENANCE: ICD-10-CM

## 2025-06-25 DIAGNOSIS — D64.9 ANEMIA, UNSPECIFIED TYPE: ICD-10-CM

## 2025-06-25 DIAGNOSIS — Z00.00 ROUTINE HEALTH MAINTENANCE: Primary | ICD-10-CM

## 2025-06-25 LAB
25(OH)D3+25(OH)D2 SERPL-MCNC: 32 NG/ML (ref 30–96)
ABSOLUTE EOSINOPHIL (OHS): 0.11 K/UL
ABSOLUTE MONOCYTE (OHS): 0.47 K/UL (ref 0.3–1)
ABSOLUTE NEUTROPHIL COUNT (OHS): 4.24 K/UL (ref 1.8–7.7)
ALBUMIN SERPL BCP-MCNC: 4.1 G/DL (ref 3.5–5.2)
ALP SERPL-CCNC: 80 UNIT/L (ref 40–150)
ALT SERPL W/O P-5'-P-CCNC: 58 UNIT/L (ref 10–44)
ANION GAP (OHS): 9 MMOL/L (ref 8–16)
AST SERPL-CCNC: 31 UNIT/L (ref 11–45)
BASOPHILS # BLD AUTO: 0.04 K/UL
BASOPHILS NFR BLD AUTO: 0.6 %
BILIRUB SERPL-MCNC: 0.5 MG/DL (ref 0.1–1)
BUN SERPL-MCNC: 9 MG/DL (ref 6–20)
CALCIUM SERPL-MCNC: 9.3 MG/DL (ref 8.7–10.5)
CHLORIDE SERPL-SCNC: 110 MMOL/L (ref 95–110)
CO2 SERPL-SCNC: 23 MMOL/L (ref 23–29)
CREAT SERPL-MCNC: 0.7 MG/DL (ref 0.5–1.4)
EAG (OHS): 103 MG/DL (ref 68–131)
ERYTHROCYTE [DISTWIDTH] IN BLOOD BY AUTOMATED COUNT: 19.6 % (ref 11.5–14.5)
FERRITIN SERPL-MCNC: 12 NG/ML (ref 20–300)
GFR SERPLBLD CREATININE-BSD FMLA CKD-EPI: >60 ML/MIN/1.73/M2
GLUCOSE SERPL-MCNC: 86 MG/DL (ref 70–110)
HBA1C MFR BLD: 5.2 % (ref 4–5.6)
HCT VFR BLD AUTO: 35.5 % (ref 37–48.5)
HGB BLD-MCNC: 10 GM/DL (ref 12–16)
IMM GRANULOCYTES # BLD AUTO: 0.01 K/UL (ref 0–0.04)
IMM GRANULOCYTES NFR BLD AUTO: 0.2 % (ref 0–0.5)
IRON SATN MFR SERPL: 5 % (ref 20–50)
IRON SERPL-MCNC: 24 UG/DL (ref 30–160)
LYMPHOCYTES # BLD AUTO: 1.32 K/UL (ref 1–4.8)
MAGNESIUM SERPL-MCNC: 1.9 MG/DL (ref 1.6–2.6)
MCH RBC QN AUTO: 20.9 PG (ref 27–31)
MCHC RBC AUTO-ENTMCNC: 28.2 G/DL (ref 32–36)
MCV RBC AUTO: 74 FL (ref 82–98)
NUCLEATED RBC (/100WBC) (OHS): 0 /100 WBC
PLATELET # BLD AUTO: 287 K/UL (ref 150–450)
PMV BLD AUTO: 11.2 FL (ref 9.2–12.9)
POTASSIUM SERPL-SCNC: 4.4 MMOL/L (ref 3.5–5.1)
PROT SERPL-MCNC: 8 GM/DL (ref 6–8.4)
RBC # BLD AUTO: 4.79 M/UL (ref 4–5.4)
RELATIVE EOSINOPHIL (OHS): 1.8 %
RELATIVE LYMPHOCYTE (OHS): 21.3 % (ref 18–48)
RELATIVE MONOCYTE (OHS): 7.6 % (ref 4–15)
RELATIVE NEUTROPHIL (OHS): 68.5 % (ref 38–73)
SODIUM SERPL-SCNC: 142 MMOL/L (ref 136–145)
TIBC SERPL-MCNC: 487 UG/DL (ref 250–450)
TRANSFERRIN SERPL-MCNC: 329 MG/DL (ref 200–375)
TSH SERPL-ACNC: 1.52 UIU/ML (ref 0.4–4)
VIT B12 SERPL-MCNC: 342 PG/ML (ref 210–950)
WBC # BLD AUTO: 6.19 K/UL (ref 3.9–12.7)

## 2025-06-25 PROCEDURE — 83540 ASSAY OF IRON: CPT

## 2025-06-25 PROCEDURE — 82306 VITAMIN D 25 HYDROXY: CPT

## 2025-06-25 PROCEDURE — 82607 VITAMIN B-12: CPT

## 2025-06-25 PROCEDURE — 36415 COLL VENOUS BLD VENIPUNCTURE: CPT | Mod: PN

## 2025-06-25 PROCEDURE — 84443 ASSAY THYROID STIM HORMONE: CPT

## 2025-06-25 PROCEDURE — 99999 PR PBB SHADOW E&M-EST. PATIENT-LVL V: CPT | Mod: PBBFAC,,, | Performed by: NURSE PRACTITIONER

## 2025-06-25 PROCEDURE — 83735 ASSAY OF MAGNESIUM: CPT

## 2025-06-25 PROCEDURE — 80053 COMPREHEN METABOLIC PANEL: CPT

## 2025-06-25 PROCEDURE — 83036 HEMOGLOBIN GLYCOSYLATED A1C: CPT

## 2025-06-25 PROCEDURE — 99215 OFFICE O/P EST HI 40 MIN: CPT | Mod: PBBFAC,PN | Performed by: NURSE PRACTITIONER

## 2025-06-25 PROCEDURE — 85025 COMPLETE CBC W/AUTO DIFF WBC: CPT

## 2025-06-25 PROCEDURE — 82728 ASSAY OF FERRITIN: CPT

## 2025-06-25 RX ORDER — SERTRALINE HYDROCHLORIDE 100 MG/1
100 TABLET, FILM COATED ORAL DAILY
Qty: 90 TABLET | Refills: 1 | Status: SHIPPED | OUTPATIENT
Start: 2025-06-25 | End: 2025-12-22

## 2025-06-25 NOTE — PROGRESS NOTES
Patient ID: Raina Hoffman is a 44 y.o. female.    Chief Complaint: Annual Exam (43 yo female here for annual check up. Pt states concern of bilateral hands and leg shacks times years and worsening times 6 mo. KM)    History of Present Illness    CHIEF COMPLAINT:  Ms. Hoffman presents for a follow-up visit to discuss multiple concerns including low iron, hand tremors, and to request referrals for various health screenings.    HPI:  Ms. Hoffman had her gallbladder removed in September, performed by Dr. Warner. Prior to the surgery, she was evaluated at the emergency room twice that year, where labs consistently showed low iron levels.    She complains of hand tremors, present for years but worsened recently. The tremors affect both hands, sometimes one more severely than the other. Her uncles also have similar tremors. While driving, her leg occasionally shakes when her foot is on the gas pedal, but the tremor ceases with firm pressure.    One of her uncles sought medical attention for similar tremors, but no cause was identified. She denies consuming large amounts of caffeine.    She mentions a mole on the side of her head that is increasing in size, for which she is seeking a dermatology referral.    She is currently taking Semaglutide and reports weight loss as a result.    She denies fever, chills, or dropping objects due to tremors.    MEDICATIONS:  Ms. Hoffman is on Zoloft 100 mg daily and Semaglutide for weight loss.    MEDICAL HISTORY:  Ms. Hoffman has a history of iron deficiency. Ms. Hoffman has not had a recent Pap smear.    FAMILY HISTORY:  Family history is significant for uncle(s) with tremors. Ms. Hoffman's father was diagnosed with colon cancer approximately 2 years ago. He had polyps removed previously, underwent treatment, and is currently stable.    SURGICAL HISTORY:  She underwent a cholecystectomy (gallbladder removal) in September, which was performed by Dr. Warner.    TEST  "RESULTS:  Ms. Hoffman's iron levels were found to be low during ER visits prior to her gallbladder removal.      ROS:  General: -fever, -chills, -fatigue, -weight gain, +weight loss  Eyes: -vision changes, -redness, -discharge  ENT: -ear pain, -nasal congestion, -sore throat  Cardiovascular: -chest pain, -palpitations, -lower extremity edema  Respiratory: -cough, -shortness of breath  Gastrointestinal: -abdominal pain, -nausea, -vomiting, -diarrhea, -constipation, -blood in stool  Genitourinary: -dysuria, -hematuria, -frequency  Musculoskeletal: -joint pain, -muscle pain  Skin: -rash, -lesion, +skin growth, +abnormal moles  Neurological: -headache, -dizziness, -numbness, -tingling, +tremors  Psychiatric: -anxiety, -depression, -sleep difficulty             Past Medical History:   Diagnosis Date    Anxiety     Depression      Past Surgical History:   Procedure Laterality Date    APPENDECTOMY       SECTION      EYE SURGERY      LAPAROSCOPIC CHOLECYSTECTOMY N/A 2024    Procedure: CHOLECYSTECTOMY, LAPAROSCOPIC;  Surgeon: Benji Warner MD;  Location: [facility];  Service: General;  Laterality: N/A;    TUBAL LIGATION           Tobacco History:  reports that she has never smoked. She has never been exposed to tobacco smoke. She has never used smokeless tobacco.      Review of patient's allergies indicates:  No Known Allergies  Current Medications[1]           Objective:      Vitals:    25 0833   BP: 136/88   Pulse: 72   Resp: 20   Temp: 98 °F (36.7 °C)   TempSrc: Oral   Weight: 109.5 kg (241 lb 6.5 oz)   Height: 5' 6" (1.676 m)     Physical Exam  Constitutional:       Appearance: Normal appearance. She is obese.   HENT:      Right Ear: Tympanic membrane normal.      Left Ear: Tympanic membrane normal.      Nose: Nose normal.      Mouth/Throat:      Mouth: Mucous membranes are moist.   Cardiovascular:      Rate and Rhythm: Normal rate and regular rhythm.      Pulses: Normal pulses.      Heart sounds: " Normal heart sounds.   Pulmonary:      Breath sounds: Normal breath sounds.   Abdominal:      Palpations: Abdomen is soft.   Skin:     General: Skin is warm.   Neurological:      Mental Status: She is alert and oriented to person, place, and time.   Psychiatric:         Mood and Affect: Mood normal.         Behavior: Behavior normal.           Assessment:       1. Routine health maintenance    2. Depression, unspecified depression type    3. Anemia, unspecified type    4. Well woman exam    5. Atypical nevi    6. Fine tremor    7. Breast cancer screening by mammogram           Plan:       Assessment & Plan    E61.1 Iron deficiency  R25.0 Abnormal head movements  R25.1 Tremor, unspecified  L81.9 Disorder of pigmentation, unspecified  Z90.49 Acquired absence of other specified parts of digestive tract  Z80.0 Family history of malignant neoplasm of digestive organs    IRON DEFICIENCY:  - Discussed patient's reported low iron levels from previous labs.  - Plan to check iron and ferritin levels in upcoming labs.    TREMOR:  - Ms. Hoffman reports shaking in hands and legs, sometimes worse in one hand than the other.  - Assessed tremors as potentially neurological in nature, possibly idiopathic given family history (patient's uncle had similar symptoms without diagnosis).  - Discussed that caffeine can sometimes cause tremors, though patient denied high caffeine intake.  - Ordered comprehensive labs including CBC, CMP, thyroid panel, vitamin D, magnesium, iron studies (TIBC, ferritin), hemoglobin A1C, and vitamin B12 to rule out potential causes of tremors.  - Referred to Neurology for evaluation of abnormal movements and fine tremors.  - Propranolol noted as potential treatment option if workup is inconclusive.  - Instructed patient to contact office if neurology referral is delayed.    PIGMENTATION DISORDER:  - Referred to Dermatology for evaluation of enlarging mole on side of head.    ABSENCE OF GALLBLADDER:  - Noted  patient had cholecystectomy in September.    FAMILY HISTORY OF COLON CANCER:  - Discussed importance of colonoscopy screening due to family history of colon cancer.  - Recommend follow-up when patient turns 45 for colonoscopy screening.    FOLLOW-UP AND PREVENTIVE CARE:  - Continued Zoloft 100 mg daily.  - Ordered mammogram and referred to Gynecology for Pap smear.         Routine health maintenance  -     Comprehensive Metabolic Panel; Future; Expected date: 06/25/2025  -     Hemoglobin A1C; Future; Expected date: 06/25/2025    Depression, unspecified depression type  -     TSH; Future; Expected date: 06/25/2025  -     Vitamin D; Future; Expected date: 06/25/2025  -     sertraline (ZOLOFT) 100 MG tablet; Take 1 tablet (100 mg total) by mouth once daily.  Dispense: 90 tablet; Refill: 1    Anemia, unspecified type  -     CBC Auto Differential; Future; Expected date: 06/25/2025  -     Vitamin D; Future; Expected date: 06/25/2025  -     Iron and TIBC; Future; Expected date: 06/25/2025  -     Ferritin; Future; Expected date: 06/25/2025  -     Vitamin B12; Future; Expected date: 06/25/2025    Well woman exam  -     Ambulatory referral/consult to Obstetrics / Gynecology; Future; Expected date: 07/02/2025    Atypical nevi  -     Ambulatory referral/consult to Dermatology; Future; Expected date: 07/02/2025    Fine tremor  -     Vitamin D; Future; Expected date: 06/25/2025  -     Magnesium; Future; Expected date: 06/25/2025  -     Ambulatory referral/consult to Neurology; Future; Expected date: 07/02/2025    Breast cancer screening by mammogram  -     Mammo Digital Screening Bilat w/ Brandan (XPD); Future; Expected date: 06/25/2025      Follow up in about 6 months (around 12/25/2025), or if symptoms worsen or fail to improve.        6/25/2025 Laura Abdalla NP    This note was generated with the assistance of ambient listening technology. Verbal consent was obtained by the patient and accompanying visitor(s) for the  recording of patient appointment to facilitate this note. I attest to having reviewed and edited the generated note for accuracy, though some syntax or spelling errors may persist. Please contact the author of this note for any clarification.             [1]   Current Outpatient Medications:     fluticasone propionate (FLONASE) 50 mcg/actuation nasal spray, 1 spray (50 mcg total) by Each Nostril route 2 (two) times daily as needed for Rhinitis., Disp: 15 g, Rfl: 0    ondansetron (ZOFRAN-ODT) 4 MG TbDL, Take 1 tablet (4 mg total) by mouth 2 (two) times daily., Disp: 15 tablet, Rfl: 0    sertraline (ZOLOFT) 100 MG tablet, Take 1 tablet (100 mg total) by mouth once daily., Disp: 90 tablet, Rfl: 1

## 2025-06-30 ENCOUNTER — HOSPITAL ENCOUNTER (OUTPATIENT)
Dept: RADIOLOGY | Facility: HOSPITAL | Age: 45
Discharge: HOME | End: 2025-06-30
Attending: NURSE PRACTITIONER
Payer: MEDICAID

## 2025-06-30 DIAGNOSIS — Z12.31 BREAST CANCER SCREENING BY MAMMOGRAM: ICD-10-CM

## 2025-06-30 PROCEDURE — 77067 SCR MAMMO BI INCL CAD: CPT | Mod: 26,,, | Performed by: RADIOLOGY

## 2025-06-30 PROCEDURE — 77067 SCR MAMMO BI INCL CAD: CPT | Mod: TC,PO

## 2025-06-30 PROCEDURE — 77063 BREAST TOMOSYNTHESIS BI: CPT | Mod: 26,,, | Performed by: RADIOLOGY

## 2025-07-01 ENCOUNTER — PATIENT MESSAGE (OUTPATIENT)
Dept: FAMILY MEDICINE | Facility: CLINIC | Age: 45
End: 2025-07-01
Payer: MEDICAID

## 2025-07-01 ENCOUNTER — RESULTS FOLLOW-UP (OUTPATIENT)
Dept: FAMILY MEDICINE | Facility: CLINIC | Age: 45
End: 2025-07-01

## 2025-07-02 DIAGNOSIS — D50.9 IRON DEFICIENCY ANEMIA, UNSPECIFIED IRON DEFICIENCY ANEMIA TYPE: Primary | ICD-10-CM

## 2025-07-10 ENCOUNTER — HOSPITAL ENCOUNTER (OUTPATIENT)
Dept: RADIOLOGY | Facility: HOSPITAL | Age: 45
Discharge: HOME | End: 2025-07-10
Attending: NURSE PRACTITIONER
Payer: MEDICAID

## 2025-07-10 DIAGNOSIS — R92.8 ABNORMAL MAMMOGRAM: ICD-10-CM

## 2025-07-10 PROCEDURE — 77061 BREAST TOMOSYNTHESIS UNI: CPT | Mod: 26,LT,, | Performed by: RADIOLOGY

## 2025-07-10 PROCEDURE — 77065 DX MAMMO INCL CAD UNI: CPT | Mod: 26,LT,, | Performed by: RADIOLOGY

## 2025-07-10 PROCEDURE — 77065 DX MAMMO INCL CAD UNI: CPT | Mod: TC,PO,LT

## 2025-07-10 PROCEDURE — 76642 ULTRASOUND BREAST LIMITED: CPT | Mod: 26,LT,, | Performed by: RADIOLOGY

## 2025-07-10 PROCEDURE — 76642 ULTRASOUND BREAST LIMITED: CPT | Mod: TC,PO,LT

## 2025-09-03 ENCOUNTER — TELEPHONE (OUTPATIENT)
Dept: FAMILY MEDICINE | Facility: CLINIC | Age: 45
End: 2025-09-03
Payer: MEDICAID

## (undated) DEVICE — CANNULA ENDOPATH XCEL 5X100MM

## (undated) DEVICE — BAG TISS RETRV MONARCH 10MM

## (undated) DEVICE — APPLIER CLIP ENDO LIGAMAX 5MM

## (undated) DEVICE — SOL NACL IRR 1000ML BTL

## (undated) DEVICE — IRRIGATOR SUCTION W/TIP

## (undated) DEVICE — CORD MPLR STR PLUG DISP 10FT

## (undated) DEVICE — ELECTRODE BLADE INSULATED 1 IN

## (undated) DEVICE — GOWN AERO CHROME W/ TOWEL XL

## (undated) DEVICE — SUT MCRYL PLUS 4-0 PS2 27IN

## (undated) DEVICE — TROCAR ENDOPATH XCEL 5X100MM

## (undated) DEVICE — SYS SEE SHARP SCOPE ANTIFOG

## (undated) DEVICE — DISSECTOR KITTNER 5MM T 45CM S

## (undated) DEVICE — GLOVE BIOGEL PI MICRO SZ 7.5

## (undated) DEVICE — SCISSOR 5MMX35CM DIRECT DRIVE

## (undated) DEVICE — SUT VICRYL+ 27 UR-6 VIOL

## (undated) DEVICE — TRAY GENERAL LAPAROSCOPY SMH

## (undated) DEVICE — ELECTRODE REM PLYHSV RETURN 9

## (undated) DEVICE — TROCAR KII BLLN 12MM 10CM

## (undated) DEVICE — SYS EXOFIN SKIN CLOSURE 22CM